# Patient Record
Sex: FEMALE | Race: WHITE | NOT HISPANIC OR LATINO | Employment: STUDENT | URBAN - METROPOLITAN AREA
[De-identification: names, ages, dates, MRNs, and addresses within clinical notes are randomized per-mention and may not be internally consistent; named-entity substitution may affect disease eponyms.]

---

## 2017-01-04 ENCOUNTER — OFFICE VISIT (OUTPATIENT)
Dept: MEDICAL GROUP | Facility: MEDICAL CENTER | Age: 22
End: 2017-01-04
Payer: COMMERCIAL

## 2017-01-04 VITALS
HEIGHT: 68 IN | HEART RATE: 111 BPM | DIASTOLIC BLOOD PRESSURE: 82 MMHG | BODY MASS INDEX: 20.92 KG/M2 | OXYGEN SATURATION: 98 % | RESPIRATION RATE: 12 BRPM | TEMPERATURE: 98 F | SYSTOLIC BLOOD PRESSURE: 104 MMHG | WEIGHT: 138 LBS

## 2017-01-04 DIAGNOSIS — Z30.40 ENCOUNTER FOR SURVEILLANCE OF CONTRACEPTIVES: ICD-10-CM

## 2017-01-04 DIAGNOSIS — R41.840 ATTENTION DEFICIT: ICD-10-CM

## 2017-01-04 DIAGNOSIS — L70.0 ACNE VULGARIS: ICD-10-CM

## 2017-01-04 DIAGNOSIS — F32.89 OTHER DEPRESSION: ICD-10-CM

## 2017-01-04 DIAGNOSIS — F41.9 ANXIETY: ICD-10-CM

## 2017-01-04 PROCEDURE — 99214 OFFICE O/P EST MOD 30 MIN: CPT | Performed by: NURSE PRACTITIONER

## 2017-01-04 RX ORDER — CITALOPRAM 40 MG/1
40 TABLET ORAL DAILY
Qty: 90 TAB | Refills: 2 | Status: SHIPPED | OUTPATIENT
Start: 2017-01-04 | End: 2017-11-11 | Stop reason: SDUPTHER

## 2017-01-04 RX ORDER — DROSPIRENONE AND ETHINYL ESTRADIOL 0.02-3(28)
1 KIT ORAL DAILY
Qty: 28 TAB | Refills: 11 | Status: SHIPPED | OUTPATIENT
Start: 2017-01-04 | End: 2017-02-16 | Stop reason: SDUPTHER

## 2017-01-04 ASSESSMENT — PATIENT HEALTH QUESTIONNAIRE - PHQ9: CLINICAL INTERPRETATION OF PHQ2 SCORE: 0

## 2017-01-04 NOTE — PROGRESS NOTES
Chief Complaint   Patient presents with   • Other     med check & review, patient home from college, wants to be up to date     • Anxiety     patient is having such issues with ongoing anxiety       HPI  Edilia is a 21-year-old established female here with a few concerns:  #1-depression: Feels her depression is stable. She had a fairly traumatic first semester of college with the death of 2 grandparents. She feels her moods are stable considering her circumstances. She denies any negative side effects of Celexa or Wellbutrin. He is sleeping well at night.  #2-anxiety: Chronic issue. Reports that her anxiety has been flaring up on her over the past 2 months. She was hoping that when she returned home from college for break her anxiety would lessen but it has not. She reports that she is picking at her scalp and is losing hair. She is not seeing a therapist. She would like to consider further treatment for her anxiety. She is doing well in school.  #3-attention deficit concerns: Patient reports that her mother tested her for attention deficit disorder and she scored fairly high. Her mother is a certified therapist. She states that she's had attention deficit problems throughout most of her schooling but with a larger workload in college, she notices her attention deficit more often. She would like to either be treated or have further testing for attention deficit disorder.  #4-acne: Currently taking Loestrin and using occasional Differin. She feels that Loestrin worsens her acne. She's not currently sexually active. Her menses are very light and occurring monthly. She's never had a blood clot and does not smoke cigarettes.    Past medical, surgical, family, and social history is reviewed and updated in Epic chart by me today.   Medications and allergies reviewed and updated in Epic chart by me today.     ROS:   As documented in history of present illness above    Exam:  Blood pressure 104/82, pulse 111, temperature  "36.7 °C (98 °F), resp. rate 12, height 1.727 m (5' 7.99\"), weight 62.596 kg (138 lb), SpO2 98 %.  Gen. appears healthy in no distress   Skin appropriate for sex and age . Mild facial acne   HEENT unremarkable   Neck no adenopathy, no nodules or masses palpable   Lungs clear   Heart regular   Extremities no edema   Neuro gait and station normal   Psych appropriate, calm, interactive, well groomed, makes good eye contact      A/P:  1. Acne vulgaris  -She'll continue with as needed topical Differin. Change to generic Tete in hopes of improving her acne.Pt counseled on birth control pills. Risks, benefits and complications from hormone use reviewed. Failure rates discussed. Back up contraception and STD prevention discussed.     2. Encounter for surveillance of contraceptives  -As above    3. Attention deficit  -She was referred to psychiatry for evaluation and may return here for treatment if this is more convenient.  - REFERRAL TO PSYCHIATRY    4. Other depression  -Recommend continuation of Wellbutrin  mg daily and we did discuss that this may also be helping her attention deficit issues. We'll increase her Celexa for anxiety purposes.  - citalopram (CELEXA) 40 MG Tab; Take 1 Tab by mouth every day.  Dispense: 90 Tab; Refill: 2    5. Anxiety  -As above.  email or f/u in 3-4 weeks.  Encouraged her to exercise at least 5 days per week and avoid caffeine when possible. Discussed potential side effects of increasing citalopram.  - citalopram (CELEXA) 40 MG Tab; Take 1 Tab by mouth every day.  Dispense: 90 Tab; Refill: 2      "

## 2017-01-04 NOTE — MR AVS SNAPSHOT
"        Susan Chanddarcy   2017 10:40 AM   Office Visit   MRN: [id number]    Department:  [address]   Dept Phone:  [phone]    Description:  Female : 1995   Provider:  ARMANDO Alvarez           Reason for Visit     Other med check & review, patient home from [facility], wants to be up to date      Anxiety patient is having such issues with ongoing anxiety      Allergies as of 2017     Allergen Noted Reactions    Nkda [No Known Drug Allergy] 2010         You were diagnosed with     Acne vulgaris   [884157]       Encounter for surveillance of contraceptives   [365098]       Attention deficit   [920746]       Other depression   [7603353]       Anxiety   [177203]         Vital Signs     Blood Pressure Pulse Temperature Respirations Height Weight    104/82 mmHg 111 36.7 °C (98 °F) 12 1.727 m (5' 7.99\") 62.596 kg (138 lb)    Body Mass Index Oxygen Saturation Smoking Status             20.99 kg/m2 98% Never Smoker          Basic Information     Date Of Birth Sex Race Ethnicity Preferred Language    1995 Female White Non- English      Problem List              ICD-10-CM Priority Class Noted - Resolved    Acne vulgaris L70.0   2015 - Present    Depression F32.9   3/21/2016 - Present      Health Maintenance        Date Due Completion Dates    IMM VARICELLA (CHICKENPOX) VACCINE (1 of 2 - 2 Dose Adolescent Series) 2010 ---    IMM MENINGOCOCCAL VACCINE (MCV4) (2 of 2) 2013    IMM INFLUENZA (1) 2016 ---    IMM DTaP/Tdap/Td Vaccine (6 - Td) 2020, 3/4/2003, 1999, 6/15/1998, 3/24/1998            Current Immunizations     Dtap Vaccine 3/4/2003, 1999, 6/15/1998, 3/24/1998    HIB Vaccine (ACTHIB/HIBERIX) 1999, 6/15/1998, 3/24/1998    HPV Quadrivalent Vaccine (GARDASIL) 2012  4:03 PM, 2012  2:51 PM, 2010    Hepatitis A Vaccine, Ped/Adol 10/24/2003, 3/4/2003    Hepatitis B Vaccine Non-Recombivax (Ped/Adol) " 5/18/1999, 3/24/1998, 1997    IPV 3/4/2003, 5/18/1999, 6/15/1998, 3/24/1998    MMR Vaccine 3/4/2003, 5/18/1999    Meningococcal Conjugate Vaccine MCV4 (Menactra) 8/23/2010    Tdap Vaccine 8/23/2010      Below and/or attached are the medications your provider expects you to take. Review all of your home medications and newly ordered medications with your provider and/or pharmacist. Follow medication instructions as directed by your provider and/or pharmacist. Please keep your medication list with you and share with your provider. Update the information when medications are discontinued, doses are changed, or new medications (including over-the-counter products) are added; and carry medication information at all times in the event of emergency situations     Allergies:  NKDA - (reactions not documented)               Medications  Valid as of: January 04, 2017 - 11:49 AM    Generic Name Brand Name Tablet Size Instructions for use    Adapalene (Cream) DIFFERIN 0.1 % Apply 1 Application to affected area(s) every evening.        Albuterol Sulfate (Aero Soln) albuterol 108 (90 BASE) MCG/ACT Inhale 1-2 Puffs by mouth every four hours as needed for Shortness of Breath (or coughing fit).        BuPROPion HCl (TABLET SR 24 HR) WELLBUTRIN  MG Take 1 Tab by mouth every morning.        Citalopram Hydrobromide (Tab) CELEXA 40 MG Take 1 Tab by mouth every day.        Drospirenone-Ethinyl Estradiol (Tab) LILIANA 3-0.02 MG Take 1 Tab by mouth every day.        Fluticasone Propionate (Suspension) FLONASE 50 MCG/ACT Spray 2 Sprays in nose every day.        ValACYclovir HCl (Tab) VALTREX 1 GM Take 1 Tab by mouth 2 times a day as needed (cold sore outbreak).        .                 Medicines prescribed today were sent to:     [id number] [place] - [address] [address]    Phone: [phone] Fax: [phone]    Open 24 Hours?: No    [id number] [place] - [address]    [address] [facility] [address]    Phone: [phone] Fax: [phone]    Open 24 Hours?: No      Medication refill instructions:       If your prescription bottle indicates you have medication refills left, it is not necessary to call your provider’s office. Please contact your pharmacy and they will refill your medication.    If your prescription bottle indicates you do not have any refills left, you may request refills at any time through one of the following ways: The online Safe[employer] system (except Urgent Care), by calling your provider’s office, or by asking your pharmacy to contact your provider’s office with a refill request. Medication refills are processed only during regular business hours and may not be available until the next business day. Your provider may request additional information or to have a follow-up visit with you prior to refilling your medication.   *Please Note: Medication refills are assigned a new Rx number when refilled electronically. Your pharmacy may indicate that no refills were authorized even though a new prescription for the same medication is available at the pharmacy. Please request the medicine by name with the pharmacy before contacting your provider for a refill.        Referral     A referral request has been sent to our patient care coordination department. Please allow 3-5 business days for us to process this request and contact you either by phone or mail. If you do not hear from us by the 5th business day, please call us at [phone].           SafeBoot Access Code: Activation code not generated  Current SafeBoot Status: Active

## 2017-02-16 RX ORDER — DROSPIRENONE AND ETHINYL ESTRADIOL 0.02-3(28)
1 KIT ORAL DAILY
Qty: 90 TAB | Refills: 3 | Status: SHIPPED | OUTPATIENT
Start: 2017-02-16 | End: 2018-03-05

## 2017-02-18 ENCOUNTER — OFFICE VISIT (OUTPATIENT)
Dept: URGENT CARE | Facility: PHYSICIAN GROUP | Age: 22
End: 2017-02-18
Payer: COMMERCIAL

## 2017-02-18 VITALS
DIASTOLIC BLOOD PRESSURE: 70 MMHG | TEMPERATURE: 98 F | OXYGEN SATURATION: 100 % | BODY MASS INDEX: 19.16 KG/M2 | SYSTOLIC BLOOD PRESSURE: 110 MMHG | WEIGHT: 126 LBS | HEART RATE: 94 BPM | RESPIRATION RATE: 12 BRPM

## 2017-02-18 DIAGNOSIS — R59.0 LYMPHADENOPATHY, POSTERIOR CERVICAL: ICD-10-CM

## 2017-02-18 DIAGNOSIS — J01.40 ACUTE NON-RECURRENT PANSINUSITIS: Primary | ICD-10-CM

## 2017-02-18 PROCEDURE — 99214 OFFICE O/P EST MOD 30 MIN: CPT | Performed by: PHYSICIAN ASSISTANT

## 2017-02-18 RX ORDER — SULFAMETHOXAZOLE AND TRIMETHOPRIM 800; 160 MG/1; MG/1
1 TABLET ORAL 2 TIMES DAILY
Qty: 20 TAB | Refills: 0 | Status: SHIPPED | OUTPATIENT
Start: 2017-02-18 | End: 2017-02-28

## 2017-02-18 NOTE — MR AVS SNAPSHOT
Susan Mcwilliams   2017 4:10 PM   Office Visit   MRN: [id number]    Department:  [facility]   Dept Phone:  [phone]    Description:  Female : 1995   Provider:  Dom Narvaez PA-C           Reason for Visit     Sinusitis sinus pressure, lump side of neck      Allergies as of 2017     Allergen Noted Reactions    Nkda [No Known Drug Allergy] 2010         You were diagnosed with     Acute non-recurrent pansinusitis   [[id number]]  -  Primary     Lymphadenopathy, posterior cervical   [[id number]]         Vital Signs     Blood Pressure Pulse Temperature Respirations Weight Oxygen Saturation    110/70 mmHg 94 36.7 °C (98 °F) 12 57.153 kg (126 lb) 100%    Smoking Status                   Never Smoker            Basic Information     Date Of Birth Sex Race Ethnicity Preferred Language    1995 Female White Non- English      Problem List              ICD-10-CM Priority Class Noted - Resolved    Acne vulgaris L70.0   2015 - Present    Depression F32.9   3/21/2016 - Present      Health Maintenance        Date Due Completion Dates    IMM VARICELLA (CHICKENPOX) VACCINE (1 of 2 - 2 Dose Adolescent Series) 2010 ---    IMM MENINGOCOCCAL VACCINE (MCV4) (2 of 2) 2013    IMM INFLUENZA (1) 2016 ---    IMM DTaP/Tdap/Td Vaccine (6 - Td) 2020, 3/4/2003, 1999, 6/15/1998, 3/24/1998            Current Immunizations     Dtap Vaccine 3/4/2003, 1999, 6/15/1998, 3/24/1998    HIB Vaccine (ACTHIB/HIBERIX) 1999, 6/15/1998, 3/24/1998    HPV Quadrivalent Vaccine (GARDASIL) 2012  4:03 PM, 2012  2:51 PM, 2010    Hepatitis A Vaccine, Ped/Adol 10/24/2003, 3/4/2003    Hepatitis B Vaccine Non-Recombivax (Ped/Adol) 1999, 3/24/1998, 1995    IPV 3/4/2003, 1999, 6/15/1998, 3/24/1998    MMR Vaccine 3/4/2003, 1999    Meningococcal Conjugate Vaccine MCV4 (Menactra) 2010    Tdap Vaccine 2010      Below and/or  attached are the medications your provider expects you to take. Review all of your home medications and newly ordered medications with your provider and/or pharmacist. Follow medication instructions as directed by your provider and/or pharmacist. Please keep your medication list with you and share with your provider. Update the information when medications are discontinued, doses are changed, or new medications (including over-the-counter products) are added; and carry medication information at all times in the event of emergency situations     Allergies:  NKDA - (reactions not documented)               Medications  Valid as of: February 18, 2017 -  5:14 PM    Generic Name Brand Name Tablet Size Instructions for use    Adapalene (Cream) DIFFERIN 0.1 % Apply 1 Application to affected area(s) every evening.        Albuterol Sulfate (Aero Soln) albuterol 108 (90 BASE) MCG/ACT Inhale 1-2 Puffs by mouth every four hours as needed for Shortness of Breath (or coughing fit).        BuPROPion HCl (TABLET SR 24 HR) WELLBUTRIN  MG Take 1 Tab by mouth every morning.        Citalopram Hydrobromide (Tab) CELEXA 40 MG Take 1 Tab by mouth every day.        Drospirenone-Ethinyl Estradiol (Tab) LILIANA 3-0.02 MG Take 1 Tab by mouth every day.        Fluticasone Propionate (Suspension) FLONASE 50 MCG/ACT Spray 2 Sprays in nose every day.        Sulfamethoxazole-Trimethoprim (Tab) BACTRIM -160 MG Take 1 Tab by mouth 2 times a day for 10 days.        ValACYclovir HCl (Tab) VALTREX 1 GM Take 1 Tab by mouth 2 times a day as needed (cold sore outbreak).        .                 Medicines prescribed today were sent to:     [id number] - [place] - [address] AT [facility] [address]    [address] [address]    Phone: [phone] Fax: [phone]    Open 24 Hours?: No    [id number] [place] - [address]    [address] [facility] [address]    Phone: [phone]  Fax: [phone]    Open 24 Hours?: No      Medication refill instructions:       If your prescription bottle indicates you have medication refills left, it is not necessary to call your provider’s office. Please contact your pharmacy and they will refill your medication.    If your prescription bottle indicates you do not have any refills left, you may request refills at any time through one of the following ways: The online [employer] system (except Urgent Care), by calling your provider’s office, or by asking your pharmacy to contact your provider’s office with a refill request. Medication refills are processed only during regular business hours and may not be available until the next business day. Your provider may request additional information or to have a follow-up visit with you prior to refilling your medication.   *Please Note: Medication refills are assigned a new Rx number when refilled electronically. Your pharmacy may indicate that no refills were authorized even though a new prescription for the same medication is available at the pharmacy. Please request the medicine by name with the pharmacy before contacting your provider for a refill.        Instructions    Sinusitis, Adult  Sinusitis is redness, soreness, and inflammation of the paranasal sinuses. Paranasal sinuses are air pockets within the bones of your face. They are located beneath your eyes, in the middle of your forehead, and above your eyes. In healthy paranasal sinuses, mucus is able to drain out, and air is able to circulate through them by way of your nose. However, when your paranasal sinuses are inflamed, mucus and air can become trapped. This can allow bacteria and other germs to grow and cause infection.  Sinusitis can develop quickly and last only a short time (acute) or continue over a long period (chronic). Sinusitis that lasts for more than 12 weeks is considered chronic.  CAUSES  Causes of sinusitis  include:  · Allergies.  · Structural abnormalities, such as displacement of the cartilage that separates your nostrils (deviated septum), which can decrease the air flow through your nose and sinuses and affect sinus drainage.  · Functional abnormalities, such as when the small hairs (cilia) that line your sinuses and help remove mucus do not work properly or are not present.  SIGNS AND SYMPTOMS  Symptoms of acute and chronic sinusitis are the same. The primary symptoms are pain and pressure around the affected sinuses. Other symptoms include:  · Upper toothache.  · Earache.  · Headache.  · Bad breath.  · Decreased sense of smell and taste.  · A cough, which worsens when you are lying flat.  · Fatigue.  · Fever.  · Thick drainage from your nose, which often is green and may contain pus (purulent).  · Swelling and warmth over the affected sinuses.  DIAGNOSIS  Your health care provider will perform a physical exam. During your exam, your health care provider may perform any of the following to help determine if you have acute sinusitis or chronic sinusitis:  · Look in your nose for signs of abnormal growths in your nostrils (nasal polyps).  · Tap over the affected sinus to check for signs of infection.  · View the inside of your sinuses using an imaging device that has a light attached (endoscope).  If your health care provider suspects that you have chronic sinusitis, one or more of the following tests may be recommended:  · Allergy tests.  · Nasal culture. A sample of mucus is taken from your nose, sent to a lab, and screened for bacteria.  · Nasal cytology. A sample of mucus is taken from your nose and examined by your health care provider to determine if your sinusitis is related to an allergy.  TREATMENT  Most cases of acute sinusitis are related to a viral infection and will resolve on their own within 10 days. Sometimes, medicines are prescribed to help relieve symptoms of both acute and chronic sinusitis.  These may include pain medicines, decongestants, nasal steroid sprays, or saline sprays.  However, for sinusitis related to a bacterial infection, your health care provider will prescribe antibiotic medicines. These are medicines that will help kill the bacteria causing the infection.  Rarely, sinusitis is caused by a fungal infection. In these cases, your health care provider will prescribe antifungal medicine.  For some cases of chronic sinusitis, surgery is needed. Generally, these are cases in which sinusitis recurs more than 3 times per year, despite other treatments.  HOME CARE INSTRUCTIONS  · Drink plenty of water. Water helps thin the mucus so your sinuses can drain more easily.  · Use a humidifier.  · Inhale steam 3-4 times a day (for example, sit in the bathroom with the shower running).  · Apply a warm, moist washcloth to your face 3-4 times a day, or as directed by your health care provider.  · Use saline nasal sprays to help moisten and clean your sinuses.  · Take medicines only as directed by your health care provider.  · If you were prescribed either an antibiotic or antifungal medicine, finish it all even if you start to feel better.  SEEK IMMEDIATE MEDICAL CARE IF:  · You have increasing pain or severe headaches.  · You have nausea, vomiting, or drowsiness.  · You have swelling around your face.  · You have vision problems.  · You have a stiff neck.  · You have difficulty breathing.     This information is not intended to replace advice given to you by your health care provider. Make sure you discuss any questions you have with your health care provider.     Document Released: 12/18/2006 Document Revised: 01/08/2016 Document Reviewed: 01/01/2013  [employer] Interactive Patient Education ©2016 [employer]            [employer] Access Code: Activation code not generated  Current S[employer] Status: Active

## 2017-02-19 NOTE — PATIENT INSTRUCTIONS

## 2017-02-19 NOTE — PROGRESS NOTES
Subjective:      Pt is a 21 y.o. female who presents with Sinusitis            Sinusitis  This is a new problem. The current episode started in the past 7 days. The problem has been gradually worsening since onset. There has been no fever. Her pain is at a severity of 7/10. The pain is moderate. Past treatments include oral decongestants and saline sprays. The treatment provided no relief.   PT presents to  clinic today complaining of sore throat, fevers, chills, watery eyes, pressure in ears, cough, fatigue, runny nose, post nasal drip, sinus pressure and headache and swollen left neck lymph node. PT denies CP, SOB, NVD, abdominal pain, joint pain. PT states these symptoms began around 5 days ago and that the pt's family has been sick on and off for the last few weeks. Pt has not taken any medications for this condition. PT states the pain is a 7/10 with sinus pressure, aching in nature and worse at night. The pt's medication list, problem list, and allergies have been evaluated and reviewed during today's visit.    PMH:  Past Medical History   Diagnosis Date   • No known health problems        PSH:  Negative per pt.      Fam Hx:    Mother alive and well with no major medical  Issues    Family Status   Relation Status Death Age   • Mother Alive    • Father Alive        Soc HX:  Social History     Social History   • Marital Status: Single     Spouse Name: N/A   • Number of Children: N/A   • Years of Education: N/A     Occupational History   • Not on file.     Social History Main Topics   • Smoking status: Never Smoker    • Smokeless tobacco: Not on file   • Alcohol Use: No   • Drug Use: No   • Sexual Activity: No     Other Topics Concern   • Not on file     Social History Narrative         Medications:    Current outpatient prescriptions:   •  sulfamethoxazole-trimethoprim (BACTRIM DS) 800-160 MG tablet, Take 1 Tab by mouth 2 times a day for 10 days., Disp: 20 Tab, Rfl: 0  •  drospirenone-ethinyl estradiol (LILIANA)  3-0.02 MG per tablet, Take 1 Tab by mouth every day., Disp: 90 Tab, Rfl: 3  •  citalopram (CELEXA) 40 MG Tab, Take 1 Tab by mouth every day., Disp: 90 Tab, Rfl: 2  •  buPROPion (WELLBUTRIN XL) 300 MG XL tablet, Take 1 Tab by mouth every morning., Disp: 90 Tab, Rfl: 3  •  adapalene (DIFFERIN) 0.1 % cream, Apply 1 Application to affected area(s) every evening., Disp: 45 g, Rfl: 3  •  fluticasone (FLONASE) 50 MCG/ACT nasal spray, Spray 2 Sprays in nose every day., Disp: 16 g, Rfl: 4  •  valacyclovir (VALTREX) 1 GM TABS, Take 1 Tab by mouth 2 times a day as needed (cold sore outbreak)., Disp: 60 Tab, Rfl: 5  •  albuterol (VENTOLIN HFA) 108 (90 BASE) MCG/ACT AERS, Inhale 1-2 Puffs by mouth every four hours as needed for Shortness of Breath (or coughing fit)., Disp: 1 Inhaler, Rfl: 2      Allergies:  Nkda        ROS  Review of Systems   Constitutional: Positive for chills and malaise/fatigue. Negative for fever.   HENT: Positive for congestion and sore throat.    Eyes: Negative for blurred vision, double vision and photophobia.   Respiratory: Positive for cough and sputum production. Negative for hemoptysis, shortness of breath and wheezing.    Cardiovascular: Negative for chest pain and palpitations.   Gastrointestinal: Negative for nausea, vomiting, abdominal pain, diarrhea and constipation.   Genitourinary: Negative for dysuria and flank pain.   Musculoskeletal: Negative for joint pain and myalgias.   Skin: Negative for itching and rash.   Neurological: Positive for headaches. Negative for dizziness and tingling.   Endo/Heme/Allergies: Does not bruise/bleed easily. +swollen lymph node  Psychiatric/Behavioral: Negative for depression. The patient is not nervous/anxious.    All other systems reviewed and are negative.       Objective:     /70 mmHg  Pulse 94  Temp(Src) 36.7 °C (98 °F)  Resp 12  Wt 57.153 kg (126 lb)  SpO2 100%     Physical Exam    Physical Exam   Constitutional: Pt is oriented to person, place,  and time. Pt appears well-developed and well-nourished. No distress.   HENT:   Head: Normocephalic and atraumatic.   Right Ear: Hearing, tympanic membrane, external ear and ear canal normal.   Left Ear: Hearing, tympanic membrane, external ear and ear canal normal.   Nose: Mucosal edema, rhinorrhea and sinus tenderness present. No nose lacerations, nasal deformity, septal deviation or nasal septal hematoma. No epistaxis.  No foreign bodies. Right sinus exhibits maxillary sinus tenderness and frontal sinus tenderness. Left sinus exhibits maxillary sinus tenderness and frontal sinus tenderness.   Mouth/Throat: Oropharynx is clear and moist. No oropharyngeal exudate.   Eyes: Conjunctivae normal and EOM are normal. Pupils are equal, round, and reactive to light. Right eye exhibits no discharge. Left eye exhibits no discharge.   Neck: Normal range of motion. Neck supple. No tracheal deviation present. No thyromegaly present.   Cardiovascular: Normal rate, regular rhythm, normal heart sounds and intact distal pulses.  Exam reveals no gallop and no friction rub.    No murmur heard.  Pulmonary/Chest: Effort normal and breath sounds normal. No respiratory distress. Pt has no wheezes. Pt has no rales. Pt exhibits no tenderness.   Abdominal: Soft. Bowel sounds are normal. Pt exhibits no distension and no mass. There is no tenderness. There is no rebound and no guarding.   Musculoskeletal: Normal range of motion. Pt exhibits no edema and no tenderness.   Lymphadenopathy:     Pt has left posterior cervical adenopathy.   Neurological: Pt is alert and oriented to person, place, and time. Pt has normal reflexes. Pt displays normal reflexes. No cranial nerve deficit. Pt exhibits normal muscle tone. Coordination normal.   Skin: Skin is warm and dry. No rash noted. No erythema.   Psychiatric: Pt has a normal mood and affect. Pt behavior is normal. Judgment and thought content normal.                 Assessment/Plan:     1. Acute  non-recurrent pansinusitis    - sulfamethoxazole-trimethoprim (BACTRIM DS) 800-160 MG tablet; Take 1 Tab by mouth 2 times a day for 10 days.  Dispense: 20 Tab; Refill: 0    2. Lymphadenopathy, posterior cervical      Rest, fluids encouraged.  OTC decongestant for congestion/cough  AVS with medical info given.  Pt was in full understanding and agreement with the plan.  Follow-up as needed if symptoms worsen or fail to improve.

## 2017-03-27 ENCOUNTER — HOSPITAL ENCOUNTER (OUTPATIENT)
Dept: LAB | Facility: MEDICAL CENTER | Age: 22
End: 2017-03-27
Attending: NURSE PRACTITIONER
Payer: COMMERCIAL

## 2017-03-27 ENCOUNTER — OFFICE VISIT (OUTPATIENT)
Dept: MEDICAL GROUP | Facility: LAB | Age: 22
End: 2017-03-27
Payer: COMMERCIAL

## 2017-03-27 VITALS
TEMPERATURE: 99.5 F | RESPIRATION RATE: 12 BRPM | OXYGEN SATURATION: 99 % | HEART RATE: 96 BPM | HEIGHT: 68 IN | BODY MASS INDEX: 19.61 KG/M2 | SYSTOLIC BLOOD PRESSURE: 92 MMHG | DIASTOLIC BLOOD PRESSURE: 72 MMHG | WEIGHT: 129.4 LBS

## 2017-03-27 DIAGNOSIS — R68.89 FREQUENTLY SICK: ICD-10-CM

## 2017-03-27 DIAGNOSIS — R23.3 EASY BRUISABILITY: ICD-10-CM

## 2017-03-27 DIAGNOSIS — L85.3 DRY SKIN DERMATITIS: ICD-10-CM

## 2017-03-27 DIAGNOSIS — J06.9 ACUTE URI: ICD-10-CM

## 2017-03-27 LAB
ANION GAP SERPL CALC-SCNC: 8 MMOL/L (ref 0–11.9)
BASOPHILS # BLD AUTO: 0.08 K/UL (ref 0–0.12)
BASOPHILS NFR BLD AUTO: 1.2 % (ref 0–1.8)
BUN SERPL-MCNC: 11 MG/DL (ref 8–22)
CALCIUM SERPL-MCNC: 9.6 MG/DL (ref 8.5–10.5)
CHLORIDE SERPL-SCNC: 104 MMOL/L (ref 96–112)
CO2 SERPL-SCNC: 26 MMOL/L (ref 20–33)
CREAT SERPL-MCNC: 0.92 MG/DL (ref 0.5–1.4)
EOSINOPHIL # BLD: 0.17 K/UL (ref 0–0.51)
EOSINOPHIL NFR BLD AUTO: 2.5 % (ref 0–6.9)
ERYTHROCYTE [DISTWIDTH] IN BLOOD BY AUTOMATED COUNT: 41.9 FL (ref 35.9–50)
GLUCOSE SERPL-MCNC: 97 MG/DL (ref 65–99)
HCT VFR BLD AUTO: 45.8 % (ref 37–47)
HGB BLD-MCNC: 15 G/DL (ref 12–16)
IMM GRANULOCYTES # BLD AUTO: 0.01 K/UL (ref 0–0.11)
IMM GRANULOCYTES NFR BLD AUTO: 0.1 % (ref 0–0.9)
LYMPHOCYTES # BLD: 2.41 K/UL (ref 1–4.8)
LYMPHOCYTES NFR BLD AUTO: 35.5 % (ref 22–41)
MCH RBC QN AUTO: 30.9 PG (ref 27–33)
MCHC RBC AUTO-ENTMCNC: 32.8 G/DL (ref 33.6–35)
MCV RBC AUTO: 94.2 FL (ref 81.4–97.8)
MONOCYTES # BLD: 0.31 K/UL (ref 0–0.85)
MONOCYTES NFR BLD AUTO: 4.6 % (ref 0–13.4)
NEUTROPHILS # BLD: 3.8 K/UL (ref 2–7.15)
NEUTROPHILS NFR BLD AUTO: 56.1 % (ref 44–72)
NRBC # BLD AUTO: 0 K/UL
NRBC BLD-RTO: 0 /100 WBC
PLATELET # BLD AUTO: 345 K/UL (ref 164–446)
PMV BLD AUTO: 9.6 FL (ref 9–12.9)
POTASSIUM SERPL-SCNC: 4.4 MMOL/L (ref 3.6–5.5)
RBC # BLD AUTO: 4.86 M/UL (ref 4.2–5.4)
SODIUM SERPL-SCNC: 138 MMOL/L (ref 135–145)
WBC # BLD AUTO: 6.8 K/UL (ref 4.8–10.8)

## 2017-03-27 PROCEDURE — 36415 COLL VENOUS BLD VENIPUNCTURE: CPT

## 2017-03-27 PROCEDURE — 85025 COMPLETE CBC W/AUTO DIFF WBC: CPT

## 2017-03-27 PROCEDURE — 80048 BASIC METABOLIC PNL TOTAL CA: CPT

## 2017-03-27 PROCEDURE — 99214 OFFICE O/P EST MOD 30 MIN: CPT | Performed by: NURSE PRACTITIONER

## 2017-03-27 NOTE — PROGRESS NOTES
"Chief Complaint   Patient presents with   • Sinusitis     pt states she had some sinus headache, swollen lymph nodes on right side, some nasal congestion, onset 2 months    • Rash     pt has a dry, scaly rash, round rash on her right leg,          HPI  Reason 21-year-old established male here with a few issues:  #1-she is concerned because she seems to be getting sick fairly frequently. She describes having a few sinus infections over the winter as well as some various coughs and colds. She is a freshman in college, living in a dorm room. She is also a nanny. The patient does not smoke. She currently has had some congestion, dry cough and a sore throat for the past week. She does have some swollen lymph nodes in her neck that she would like examined. She is also concerned because she bruises easily. She is worried about her immune system. She is not exercising regularly but she reports that she is eating healthy. She does not sleep great at night, stating that she has a roommate who wakes up really early.  #2-she has a dry rash to her right thigh that she would like examined. The rash is occasionally itchy. She has been using lotion and topical cortisone to the area which has been helping.      Past medical, surgical, family, and social history is reviewed and updated in Epic chart by me today.   Medications and allergies reviewed and updated in Epic chart by me today.     ROS:   Denies nausea, vomiting or diarrhea. No unintentional weight loss.    Exam:  Blood pressure 92/72, pulse 96, temperature 37.5 °C (99.5 °F), resp. rate 12, height 1.727 m (5' 7.99\"), weight 58.695 kg (129 lb 6.4 oz), SpO2 99 %.  Constitutional: Alert, no distress, plus 3 vital signs  Skin:  Warm, dry. She has a two centimeter dry patch to her right anterior thigh that appears to be healing, it is only slightly hypopigmented with no surrounding erythema.  Eye: Equal, round and reactive, conjunctiva clear  ENMT: Lips without lesions, good " dentition, oropharynx clear without erythema, swelling or injection.   Neck: Trachea midline, no masses, no thyromegaly. She does have bilateral anterior cervical lymphadenopathy that is nontender.  Respiratory: Unlabored respiration, lungs clear to auscultation, no wheezes, no rhonchi  Cardiovascular: Normal rate and rhythm, no murmur, no edema  Psych: Alert, pleasant, well-groomed, normal affect    A/P:  1. Frequently sick  -Discussed that it is common for her to be more sick than what she is used to during her freshman year of college and in community living. 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 Will check lab work today. Recommend vitamin C, daily physical exercise and focusing on obtaining more sleep at night if possible.  - CBC WITH DIFFERENTIAL; Future  - BASIC METABOLIC PANEL; Future    2. Easy bruisability  - CBC WITH DIFFERENTIAL; Future  - BASIC METABOLIC PANEL; Future    3.  Acute URI: Improving, per patient. No sign of bacteria. Discussed vitamin C, rest and symptomatic care. Follow up with any worsening.    4. Dry skin dermatitis: Continue with emollients moisturizers and topical cortisone as needed.

## 2017-03-27 NOTE — MR AVS SNAPSHOT
"        Susan Poppy   3/27/2017 9:00 AM   Office Visit   MRN: [id number]    Department:  [facility]   Dept Phone:  [phone]    Description:  Female : 1995   Provider:  ARMANDO Alvarez           Reason for Visit     Sinusitis pt states she had some sinus headache, swollen lymph nodes on right side, some nasal congestion, onset 2 months     Rash pt has a dry, scaly rash, round rash on her right leg,         Allergies as of 3/27/2017     Allergen Noted Reactions    Nkda [No Known Drug Allergy] 2010         You were diagnosed with     Frequently sick   [547772]       Easy bruisability   [[id number]]         Vital Signs     Blood Pressure Pulse Temperature Respirations Height Weight    92/72 mmHg 96 37.5 °C (99.5 °F) 12 1.727 m (5' 7.99\") 58.695 kg (129 lb 6.4 oz)    Body Mass Index Oxygen Saturation Smoking Status             19.68 kg/m2 99% Never Smoker          Basic Information     Date Of Birth Sex Race Ethnicity Preferred Language    1995 Female White Non- English      Problem List              ICD-10-CM Priority Class Noted - Resolved    Acne vulgaris L70.0   2015 - Present    Depression F32.9   3/21/2016 - Present      Health Maintenance        Date Due Completion Dates    IMM VARICELLA (CHICKENPOX) VACCINE (1 of 2 - 2 Dose Adolescent Series) 2010 ---    IMM MENINGOCOCCAL VACCINE (MCV4) (2 of 2) 2013    IMM INFLUENZA (1) 2016 ---    IMM DTaP/Tdap/Td Vaccine (6 - Td) 2020, 3/4/2003, 1999, 6/15/1998, 3/24/1998            Current Immunizations     Dtap Vaccine 3/4/2003, 1999, 6/15/1998, 3/24/1998    HIB Vaccine (ACTHIB/HIBERIX) 1999, 6/15/1998, 3/24/1998    HPV Quadrivalent Vaccine (GARDASIL) 2012  4:03 PM, 2012  2:51 PM, 2010    Hepatitis A Vaccine, Ped/Adol 10/24/2003, 3/4/2003    Hepatitis B Vaccine Non-Recombivax (Ped/Adol) 1999, 3/24/1998, 1997    IPV 3/4/2003, 1999, " 6/15/1998, 3/24/1998    MMR Vaccine 3/4/2003, 5/18/1999    Meningococcal Conjugate Vaccine MCV4 (Menactra) 8/23/2010    Tdap Vaccine 8/23/2010      Below and/or attached are the medications your provider expects you to take. Review all of your home medications and newly ordered medications with your provider and/or pharmacist. Follow medication instructions as directed by your provider and/or pharmacist. Please keep your medication list with you and share with your provider. Update the information when medications are discontinued, doses are changed, or new medications (including over-the-counter products) are added; and carry medication information at all times in the event of emergency situations     Allergies:  NKDA - (reactions not documented)               Medications  Valid as of: March 27, 2017 -  9:41 AM    Generic Name Brand Name Tablet Size Instructions for use    Albuterol Sulfate (Aero Soln) albuterol 108 (90 BASE) MCG/ACT Inhale 1-2 Puffs by mouth every four hours as needed for Shortness of Breath (or coughing fit).        BuPROPion HCl (TABLET SR 24 HR) WELLBUTRIN  MG Take 1 Tab by mouth every morning.        Citalopram Hydrobromide (Tab) CELEXA 40 MG Take 1 Tab by mouth every day.        Drospirenone-Ethinyl Estradiol (Tab) LILIANA 3-0.02 MG Take 1 Tab by mouth every day.        Fluticasone Propionate (Suspension) FLONASE 50 MCG/ACT Spray 2 Sprays in nose every day.        ValACYclovir HCl (Tab) VALTREX 1 GM Take 1 Tab by mouth 2 times a day as needed (cold sore outbreak).        .                 Medicines prescribed today were sent to:     [id number] - [place] - [address] AT [facility] [address]    [address] [address]    Phone: [phone] Fax: [phone]    Open 24 Hours?: No    [id number] [place] - [address]    [address] [facility] [address]    Phone: [phone] Fax: [phone]    Open 24 Hours?: No         Medication refill instructions:       If your prescription bottle indicates you have medication refills left, it is not necessary to call your provider’s office. Please contact your pharmacy and they will refill your medication.    If your prescription bottle indicates you do not have any refills left, you may request refills at any time through one of the following ways: The online "[employer] system (except Urgent Care), by calling your provider’s office, or by asking your pharmacy to contact your provider’s office with a refill request. Medication refills are processed only during regular business hours and may not be available until the next business day. Your provider may request additional information or to have a follow-up visit with you prior to refilling your medication.   *Please Note: Medication refills are assigned a new Rx number when refilled electronically. Your pharmacy may indicate that no refills were authorized even though a new prescription for the same medication is available at the pharmacy. Please request the medicine by name with the pharmacy before contacting your provider for a refill.        Your To Do List     Future Labs/Procedures Complete By Expires    BASIC METABOLIC PANEL  As directed 3/27/2018    CBC WITH DIFFERENTIAL  As directed 3/28/2018         "[employer] Access Code: Activation code not generated  Current "[employer] Status: Active

## 2017-08-10 RX ORDER — BUPROPION HYDROCHLORIDE 300 MG/1
TABLET ORAL
Qty: 90 TAB | Refills: 1 | Status: SHIPPED | OUTPATIENT
Start: 2017-08-10 | End: 2018-02-10 | Stop reason: SDUPTHER

## 2017-08-10 NOTE — TELEPHONE ENCOUNTER
Was the patient seen in the last year in this department? Yes     Does patient have an active prescription for medications requested? No     Received Request Via: Pharmacy     Last visit:3/27/17

## 2017-11-11 DIAGNOSIS — F41.9 ANXIETY: ICD-10-CM

## 2017-11-11 DIAGNOSIS — F32.89 OTHER DEPRESSION: ICD-10-CM

## 2017-11-13 RX ORDER — CITALOPRAM 40 MG/1
40 TABLET ORAL DAILY
Qty: 90 TAB | Refills: 3 | Status: SHIPPED | OUTPATIENT
Start: 2017-11-13 | End: 2018-03-05 | Stop reason: SDUPTHER

## 2017-11-13 NOTE — TELEPHONE ENCOUNTER
Was the patient seen in the last year in this department? Yes Lov 3/27/2017    Does patient have an active prescription for medications requested? No     Received Request Via: Pharmacy

## 2017-11-26 RX ORDER — DROSPIRENONE AND ETHINYL ESTRADIOL TABLETS 0.02-3(28)
1 KIT ORAL DAILY
Qty: 28 TAB | Refills: 3 | Status: SHIPPED | OUTPATIENT
Start: 2017-11-26 | End: 2018-03-05

## 2018-02-12 RX ORDER — BUPROPION HYDROCHLORIDE 300 MG/1
TABLET ORAL
Qty: 90 TAB | Refills: 0 | Status: SHIPPED | OUTPATIENT
Start: 2018-02-12 | End: 2018-03-05 | Stop reason: SDUPTHER

## 2018-03-05 ENCOUNTER — HOSPITAL ENCOUNTER (OUTPATIENT)
Facility: MEDICAL CENTER | Age: 23
End: 2018-03-05
Attending: NURSE PRACTITIONER
Payer: COMMERCIAL

## 2018-03-05 ENCOUNTER — OFFICE VISIT (OUTPATIENT)
Dept: MEDICAL GROUP | Facility: LAB | Age: 23
End: 2018-03-05
Payer: COMMERCIAL

## 2018-03-05 VITALS
TEMPERATURE: 98.2 F | DIASTOLIC BLOOD PRESSURE: 80 MMHG | HEIGHT: 68 IN | WEIGHT: 142 LBS | HEART RATE: 98 BPM | BODY MASS INDEX: 21.52 KG/M2 | OXYGEN SATURATION: 97 % | RESPIRATION RATE: 12 BRPM | SYSTOLIC BLOOD PRESSURE: 108 MMHG

## 2018-03-05 DIAGNOSIS — Z11.3 SCREEN FOR STD (SEXUALLY TRANSMITTED DISEASE): ICD-10-CM

## 2018-03-05 DIAGNOSIS — T78.40XA HYPERSENSITIVITY DISORDER, INITIAL ENCOUNTER: ICD-10-CM

## 2018-03-05 DIAGNOSIS — F33.42 RECURRENT MAJOR DEPRESSIVE DISORDER, IN FULL REMISSION (HCC): ICD-10-CM

## 2018-03-05 DIAGNOSIS — F42.4 PICKING OWN SKIN: ICD-10-CM

## 2018-03-05 DIAGNOSIS — D22.9 ATYPICAL NEVI: ICD-10-CM

## 2018-03-05 PROCEDURE — 87491 CHLMYD TRACH DNA AMP PROBE: CPT

## 2018-03-05 PROCEDURE — 87591 N.GONORRHOEAE DNA AMP PROB: CPT

## 2018-03-05 PROCEDURE — 99214 OFFICE O/P EST MOD 30 MIN: CPT | Performed by: NURSE PRACTITIONER

## 2018-03-05 RX ORDER — BUPROPION HYDROCHLORIDE 150 MG/1
150 TABLET ORAL EVERY MORNING
Qty: 30 TAB | Refills: 5 | Status: SHIPPED | OUTPATIENT
Start: 2018-03-05 | End: 2019-08-13

## 2018-03-05 RX ORDER — CITALOPRAM 40 MG/1
40 TABLET ORAL DAILY
Qty: 90 TAB | Refills: 3 | Status: SHIPPED | OUTPATIENT
Start: 2018-03-05 | End: 2019-08-13

## 2018-03-05 ASSESSMENT — PATIENT HEALTH QUESTIONNAIRE - PHQ9: CLINICAL INTERPRETATION OF PHQ2 SCORE: 0

## 2018-03-05 NOTE — PROGRESS NOTES
"Chief Complaint   Patient presents with   • Nevus     pt states she has a new mole on the left side of her head   • Depression     medication refill        HPI  Susan is a 21 yo est female here with a few issues:   #1--her father noticed a mole to her scalp that she would like examined today. The mole does not cause her itching or pain.  #2-depression with anxiety: This is a chronic issue for the patient. She is currently treated with Wellbutrin  mg and citalopram 40 mg daily. She is happy to report that her moods of been doing great and she even went through a breakup in the past 2-3 weeks which did not affect deeply. She is having some trouble falling asleep but this has been a chronic issue. She has a good appetite. She is feeling anxious, hypersensitive and feels that even sheets touching her in bed irritate her. She is noticing that she is picking her face, scalp or the skin on her hands. She has noticed that she's been picking and hypersensitive for several months. Denies any anxiety or panic attacks.  #3-STD screening: She's never been screened for gonorrhea or Chlamydia. She is newly sexually active in the past year, not currently sexually active. She stopped her birth control a couple of months ago, was only using condoms and she was sexually active. She has had a menstrual period in the past 4 weeks.      Past medical, surgical, family, and social history is reviewed and updated in Epic chart by me today.   Medications and allergies reviewed and updated in Epic chart by me today.     ROS:   As documented in history of present illness above    Exam:  Blood pressure 108/80, pulse 98, temperature 36.8 °C (98.2 °F), resp. rate 12, height 1.727 m (5' 7.99\"), weight 64.4 kg (142 lb), SpO2 97 %.  Constitutional: Alert, no distress, plus 3 vital signs  Skin:  Warm, dry.  1 cm x 8 mm symmetrical, flat nevi to left temple - slight increased pigmentation - no black or scaling.  No wounds noticed to forearms " / face / hands or scalp.    Eye: Equal, round and reactive, conjunctiva clear  ENMT: Lips without lesions, good dentition, oropharynx clear    Neck: Trachea midline, no masses, no thyromegaly  Respiratory: Unlabored respiration, lungs clear to auscultation, no wheezes, no rhonchi  Cardiovascular: Normal rate and rhythm  Psych: Alert, pleasant, well-groomed, normal affect    A/P:  1. Recurrent major depressive disorder, in full remission (CMS-HCC)  -Suspicious that Wellbutrin may be causing her increased sensitivity of her skin and exacerbating her anxiety, potentially causing her picking. We'll try reducing her Wellbutrin to 150 mg daily and have her follow up within 4 weeks. She'll continue on citalopram.  - buPROPion (WELLBUTRIN XL) 150 MG XL tablet; Take 1 Tab by mouth every morning.  Dispense: 30 Tab; Refill: 5  - citalopram (CELEXA) 40 MG Tab; Take 1 Tab by mouth every day.  Dispense: 90 Tab; Refill: 3    2. Hypersensitivity disorder, initial encounter  -As above. Encouraged her to continue with her exercise program and avoidance of caffeine.    3. Picking own skin  -As above.    4. Screen for STD (sexually transmitted disease)  -Recommend she start having Pap smears age 23. Gonorrhea and chlamydia was collected via self swab today. Discussed the importance of condom use to prevent STDs.  - CHLAMYDIA/GC PCR URINE OR SWAB; Future    5.  Benign nevi:  Reassured. Discussed the characteristics of benign versus malignant moles.

## 2018-03-06 LAB
C TRACH DNA SPEC QL NAA+PROBE: NEGATIVE
N GONORRHOEA DNA SPEC QL NAA+PROBE: NEGATIVE
SPECIMEN SOURCE: NORMAL

## 2018-05-15 ENCOUNTER — OFFICE VISIT (OUTPATIENT)
Dept: MEDICAL GROUP | Facility: LAB | Age: 23
End: 2018-05-15
Payer: COMMERCIAL

## 2018-05-15 VITALS
SYSTOLIC BLOOD PRESSURE: 90 MMHG | BODY MASS INDEX: 21.98 KG/M2 | DIASTOLIC BLOOD PRESSURE: 62 MMHG | HEART RATE: 114 BPM | HEIGHT: 68 IN | RESPIRATION RATE: 12 BRPM | TEMPERATURE: 98.4 F | OXYGEN SATURATION: 97 % | WEIGHT: 145 LBS

## 2018-05-15 DIAGNOSIS — R59.0 POSTAURICULAR ADENOPATHY: ICD-10-CM

## 2018-05-15 DIAGNOSIS — F33.42 RECURRENT MAJOR DEPRESSIVE DISORDER, IN FULL REMISSION (HCC): ICD-10-CM

## 2018-05-15 DIAGNOSIS — Z00.00 ROUTINE GENERAL MEDICAL EXAMINATION AT A HEALTH CARE FACILITY: ICD-10-CM

## 2018-05-15 PROCEDURE — 99214 OFFICE O/P EST MOD 30 MIN: CPT | Performed by: NURSE PRACTITIONER

## 2018-05-15 RX ORDER — BUPROPION HYDROCHLORIDE 300 MG/1
300 TABLET ORAL EVERY MORNING
Qty: 90 TAB | Refills: 0
Start: 2018-05-15 | End: 2018-06-12 | Stop reason: SDUPTHER

## 2018-05-15 NOTE — PROGRESS NOTES
"Chief Complaint   Patient presents with   • Nodule     pt states she has a bump on the back her right ear, painful & hard, onset 4 days       HPI  Susan is a 22-year-old established female here to discuss a painful lump behind her right ear and her moods.     #1-Found painful bump behind right ear one week ago.  No internal ear pain or recent illness.  No trauma.  No hearing problems.  No ST or trouble swallowing. Denies any recent flareups of allergies, stating she rarely has any allergy symptoms. No unintentional weight changes. No other associated symptoms. Her dad tells her that she is tired a lot.    #2-depression: Chronic issue. Feels her moods are doing really well with Celexa 80 mg and Wellbutrin 300 mg. She tried to go down to 150 mg of Wellbutrin but felt her depression flaring up on her. She does not feel that she is tired all the time. Her motivation is good to study and attend work.    Prenatal vitamin containing iron b/c she is a vegetarian  Topical minocycline agent through dermatology    Past medical, surgical, family, and social history is reviewed and updated in Epic chart by me today.   Medications and allergies reviewed and updated in Epic chart by me today.     ROS:   As documented in history of present illness above    Exam:  Blood pressure (!) 90/62, pulse (!) 114, temperature 36.9 °C (98.4 °F), resp. rate 12, height 1.727 m (5' 7.99\"), weight 65.8 kg (145 lb), SpO2 97 %.  Constitutional: Alert, no distress, plus 3 vital signs  Skin:  Warm, dry, no rashes invisible areas  Eye: Equal, round and reactive, conjunctiva clear  ENMT: Lips without lesions, good dentition, oropharynx with +1 tonsillar hypertrophy, no erythema or exudate. She has wine, what she calls mildly tender posterior auricular lymph node that measures about 3 mm and is soft/movable. She has mild bilateral nontender anterior cervical lymphadenopathy. Tympanic membranes are translucent bilaterally.  Respiratory: Unlabored " respiration, lungs clear to auscultation, no wheezes, no rhonchi  Cardiovascular: Normal rate and rhythm  Psych: Alert, pleasant, well-groomed, normal affect    A/P:  1. Postauricular adenopathy  -Discussed likely benign and reactive nature. We'll check a CBC. Recommend a follow-up if this does not resolve within one month. Discussed massage and heating pads.    2. Recurrent major depressive disorder, in full remission (HCC)  -Stable. Should like to stay on 300 mg of Wellbutrin and 40 mg of Celexa. No SI or HI.  - buPROPion (WELLBUTRIN XL) 300 MG XL tablet; Take 1 Tab by mouth every morning.  Dispense: 90 Tab; Refill: 0    3. Routine general medical examination at a health care facility  -Recommend routine updated blood work.  - TSH WITH REFLEX TO FT4; Future  - COMP METABOLIC PANEL; Future  - CBC WITH DIFFERENTIAL; Future

## 2018-06-01 ENCOUNTER — HOSPITAL ENCOUNTER (OUTPATIENT)
Dept: LAB | Facility: MEDICAL CENTER | Age: 23
End: 2018-06-01
Attending: NURSE PRACTITIONER
Payer: COMMERCIAL

## 2018-06-01 DIAGNOSIS — Z00.00 ROUTINE GENERAL MEDICAL EXAMINATION AT A HEALTH CARE FACILITY: ICD-10-CM

## 2018-06-01 LAB
ALBUMIN SERPL BCP-MCNC: 4.5 G/DL (ref 3.2–4.9)
ALBUMIN/GLOB SERPL: 1.5 G/DL
ALP SERPL-CCNC: 67 U/L (ref 30–99)
ALT SERPL-CCNC: 10 U/L (ref 2–50)
ANION GAP SERPL CALC-SCNC: 8 MMOL/L (ref 0–11.9)
AST SERPL-CCNC: 16 U/L (ref 12–45)
BASOPHILS # BLD AUTO: 0.6 % (ref 0–1.8)
BASOPHILS # BLD: 0.03 K/UL (ref 0–0.12)
BILIRUB SERPL-MCNC: 0.5 MG/DL (ref 0.1–1.5)
BUN SERPL-MCNC: 10 MG/DL (ref 8–22)
CALCIUM SERPL-MCNC: 9.6 MG/DL (ref 8.5–10.5)
CHLORIDE SERPL-SCNC: 108 MMOL/L (ref 96–112)
CO2 SERPL-SCNC: 22 MMOL/L (ref 20–33)
CREAT SERPL-MCNC: 0.84 MG/DL (ref 0.5–1.4)
EOSINOPHIL # BLD AUTO: 0.06 K/UL (ref 0–0.51)
EOSINOPHIL NFR BLD: 1.3 % (ref 0–6.9)
ERYTHROCYTE [DISTWIDTH] IN BLOOD BY AUTOMATED COUNT: 42.4 FL (ref 35.9–50)
GLOBULIN SER CALC-MCNC: 3 G/DL (ref 1.9–3.5)
GLUCOSE SERPL-MCNC: 90 MG/DL (ref 65–99)
HCT VFR BLD AUTO: 44.3 % (ref 37–47)
HGB BLD-MCNC: 14.2 G/DL (ref 12–16)
IMM GRANULOCYTES # BLD AUTO: 0 K/UL (ref 0–0.11)
IMM GRANULOCYTES NFR BLD AUTO: 0 % (ref 0–0.9)
LYMPHOCYTES # BLD AUTO: 1.74 K/UL (ref 1–4.8)
LYMPHOCYTES NFR BLD: 37.4 % (ref 22–41)
MCH RBC QN AUTO: 28.7 PG (ref 27–33)
MCHC RBC AUTO-ENTMCNC: 32.1 G/DL (ref 33.6–35)
MCV RBC AUTO: 89.7 FL (ref 81.4–97.8)
MONOCYTES # BLD AUTO: 0.27 K/UL (ref 0–0.85)
MONOCYTES NFR BLD AUTO: 5.8 % (ref 0–13.4)
NEUTROPHILS # BLD AUTO: 2.55 K/UL (ref 2–7.15)
NEUTROPHILS NFR BLD: 54.9 % (ref 44–72)
NRBC # BLD AUTO: 0 K/UL
NRBC BLD-RTO: 0 /100 WBC
PLATELET # BLD AUTO: 343 K/UL (ref 164–446)
PMV BLD AUTO: 10.3 FL (ref 9–12.9)
POTASSIUM SERPL-SCNC: 3.8 MMOL/L (ref 3.6–5.5)
PROT SERPL-MCNC: 7.5 G/DL (ref 6–8.2)
RBC # BLD AUTO: 4.94 M/UL (ref 4.2–5.4)
SODIUM SERPL-SCNC: 138 MMOL/L (ref 135–145)
TSH SERPL DL<=0.005 MIU/L-ACNC: 2.7 UIU/ML (ref 0.38–5.33)
WBC # BLD AUTO: 4.7 K/UL (ref 4.8–10.8)

## 2018-06-01 PROCEDURE — 36415 COLL VENOUS BLD VENIPUNCTURE: CPT

## 2018-06-01 PROCEDURE — 84443 ASSAY THYROID STIM HORMONE: CPT

## 2018-06-01 PROCEDURE — 85025 COMPLETE CBC W/AUTO DIFF WBC: CPT

## 2018-06-01 PROCEDURE — 80053 COMPREHEN METABOLIC PANEL: CPT

## 2018-06-12 DIAGNOSIS — F33.42 RECURRENT MAJOR DEPRESSIVE DISORDER, IN FULL REMISSION (HCC): ICD-10-CM

## 2018-06-12 RX ORDER — BUPROPION HYDROCHLORIDE 300 MG/1
300 TABLET ORAL EVERY MORNING
Qty: 90 TAB | Refills: 0 | Status: SHIPPED | OUTPATIENT
Start: 2018-06-12 | End: 2018-09-06 | Stop reason: SDUPTHER

## 2018-08-16 ENCOUNTER — NON-PROVIDER VISIT (OUTPATIENT)
Dept: URGENT CARE | Facility: CLINIC | Age: 23
End: 2018-08-16
Payer: COMMERCIAL

## 2018-08-16 DIAGNOSIS — Z11.1 ENCOUNTER FOR PPD TEST: ICD-10-CM

## 2018-08-16 PROCEDURE — 86580 TB INTRADERMAL TEST: CPT | Performed by: NURSE PRACTITIONER

## 2018-08-16 NOTE — PROGRESS NOTES
Susan Mcwilliams is a 22 y.o. female here for a non-provider visit for PPD placement -- Step 1 of 1    Reason for PPD:  work requirement    1. TB evaluation questionnaire completed by patient? Yes      -  If any answers marked yes did you contact a provider prior to placing? Not Indicated  2.  Patient notified to return to clinic for reading on: Aug 18 after 3:01 pm and Aug 19 before 3:01pm  3.  PPD Placement documentation completed on TB evaluation questionnaire? Yes  4.  Location of TB evaluation questionnaire filed: [id number] [place] [address]

## 2018-08-19 ENCOUNTER — NON-PROVIDER VISIT (OUTPATIENT)
Dept: URGENT CARE | Facility: CLINIC | Age: 23
End: 2018-08-19

## 2018-08-19 LAB — TB WHEAL 3D P 5 TU DIAM: NORMAL MM

## 2018-08-19 NOTE — NON-PROVIDER
Susan Mcwilliams is a 22 y.o. female here for a non-provider visit for PPD reading -- Step 1 of 1.      1.  Resulted in Epic under enter/edit results? Yes   2.  TB evaluation questionnaire scanned into chart and original given to patient?Yes      3. Was induration greater than 0 mm? No.    Routed to PCP? No

## 2018-09-06 DIAGNOSIS — F33.42 RECURRENT MAJOR DEPRESSIVE DISORDER, IN FULL REMISSION (HCC): ICD-10-CM

## 2018-09-06 RX ORDER — BUPROPION HYDROCHLORIDE 300 MG/1
300 TABLET ORAL EVERY MORNING
Qty: 90 TAB | Refills: 3 | Status: SHIPPED | OUTPATIENT
Start: 2018-09-06 | End: 2019-08-13

## 2018-09-06 NOTE — TELEPHONE ENCOUNTER
From: Susan Mcwilliams  Sent: 9/6/2018 10:52 AM PDT  Subject: Medication Renewal Request    Susan Mcwilliams would like a refill of the following medications:     buPROPion (WELLBUTRIN XL) 300 MG XL tablet [Iliana Sandoval, A.P.N.]    Preferred pharmacy: [facility] [address] [place] [address], [place] - [address]    Comment:

## 2018-09-06 NOTE — TELEPHONE ENCOUNTER
Was the patient seen in the last year in this department? Yes    Does patient have an active prescription for medications requested? No     Received Request Via: Patient     Last visit:5/15/18

## 2018-10-22 ENCOUNTER — OFFICE VISIT (OUTPATIENT)
Dept: URGENT CARE | Facility: CLINIC | Age: 23
End: 2018-10-22
Payer: COMMERCIAL

## 2018-10-22 VITALS
WEIGHT: 140 LBS | RESPIRATION RATE: 16 BRPM | HEART RATE: 91 BPM | OXYGEN SATURATION: 98 % | TEMPERATURE: 98.7 F | BODY MASS INDEX: 21.22 KG/M2 | DIASTOLIC BLOOD PRESSURE: 74 MMHG | SYSTOLIC BLOOD PRESSURE: 104 MMHG | HEIGHT: 68 IN

## 2018-10-22 DIAGNOSIS — R05.9 COUGH: ICD-10-CM

## 2018-10-22 DIAGNOSIS — J34.89 NASAL CONGESTION WITH RHINORRHEA: ICD-10-CM

## 2018-10-22 DIAGNOSIS — R09.81 NASAL CONGESTION WITH RHINORRHEA: ICD-10-CM

## 2018-10-22 DIAGNOSIS — J02.9 SORE THROAT: ICD-10-CM

## 2018-10-22 DIAGNOSIS — J06.9 VIRAL URI WITH COUGH: ICD-10-CM

## 2018-10-22 LAB
INT CON NEG: NEGATIVE
INT CON POS: POSITIVE
S PYO AG THROAT QL: NEGATIVE

## 2018-10-22 PROCEDURE — 87880 STREP A ASSAY W/OPTIC: CPT | Performed by: NURSE PRACTITIONER

## 2018-10-22 PROCEDURE — 99214 OFFICE O/P EST MOD 30 MIN: CPT | Performed by: NURSE PRACTITIONER

## 2018-10-22 RX ORDER — ACETAMINOPHEN 500 MG
500-1000 TABLET ORAL EVERY 6 HOURS PRN
COMMUNITY
End: 2019-08-13

## 2018-10-22 RX ORDER — FLUTICASONE PROPIONATE 50 MCG
2 SPRAY, SUSPENSION (ML) NASAL DAILY
Qty: 1 BOTTLE | Refills: 3 | Status: SHIPPED | OUTPATIENT
Start: 2018-10-22 | End: 2020-02-13

## 2018-10-22 ASSESSMENT — ENCOUNTER SYMPTOMS
SINUS PAIN: 0
FEVER: 1
CHILLS: 0
SORE THROAT: 1
WEAKNESS: 0

## 2018-10-22 NOTE — PROGRESS NOTES
"Subjective:      Susan Mcwilliams is a 22 y.o. female who presents with Pharyngitis (x 1 day and states this morning was worse with hard to breathe)            HPI  C/o sore throat, nasal congestion, PND and sore throat. C/o upper airway \"feeling pressure\" with cough, denies asthma/SOB/wheeze. C/o \"feverish\", took NSAID x 1. No saline nasal rinse or salt water gargling.    PMH:  has a past medical history of No known health problems.  MEDS:   Current Outpatient Prescriptions:   •  acetaminophen (TYLENOL) 500 MG Tab, Take 500-1,000 mg by mouth every 6 hours as needed., Disp: , Rfl:   •  fluticasone (FLONASE) 50 MCG/ACT nasal spray, Spray 2 Sprays in nose every day., Disp: 1 Bottle, Rfl: 3  •  citalopram (CELEXA) 40 MG Tab, Take 1 Tab by mouth every day., Disp: 90 Tab, Rfl: 3  •  albuterol (VENTOLIN HFA) 108 (90 BASE) MCG/ACT AERS, Inhale 1-2 Puffs by mouth every four hours as needed for Shortness of Breath (or coughing fit)., Disp: 1 Inhaler, Rfl: 2  •  buPROPion (WELLBUTRIN XL) 300 MG XL tablet, Take 1 Tab by mouth every morning., Disp: 90 Tab, Rfl: 3  •  buPROPion (WELLBUTRIN XL) 150 MG XL tablet, Take 1 Tab by mouth every morning., Disp: 30 Tab, Rfl: 5  •  valacyclovir (VALTREX) 1 GM TABS, Take 1 Tab by mouth 2 times a day as needed (cold sore outbreak)., Disp: 60 Tab, Rfl: 5  ALLERGIES:   Allergies   Allergen Reactions   • Nkda [No Known Drug Allergy]      SURGHX: History reviewed. No pertinent surgical history.  SOCHX:  reports that she has never smoked. She has never used smokeless tobacco. She reports that she does not drink alcohol or use drugs.  FH: Family history was reviewed, no pertinent findings to report    Review of Systems   Constitutional: Positive for fever and malaise/fatigue. Negative for chills.   HENT: Positive for congestion, ear pain and sore throat. Negative for sinus pain.    Neurological: Negative for weakness.   All other systems reviewed and are negative.         Objective:     /74 " "(BP Location: Left arm, Patient Position: Sitting, BP Cuff Size: Adult)   Pulse 91   Temp 37.1 °C (98.7 °F) (Temporal)   Resp 16   Ht 1.727 m (5' 8\")   Wt 63.5 kg (140 lb)   SpO2 98%   BMI 21.29 kg/m²      Physical Exam   Constitutional: She is oriented to person, place, and time. Vital signs are normal. She appears well-developed and well-nourished. She is active and cooperative.  Non-toxic appearance. She does not have a sickly appearance. She does not appear ill. No distress.   HENT:   Head: Normocephalic.   Right Ear: External ear and ear canal normal.   Left Ear: Tympanic membrane, external ear and ear canal normal.   Nose: Mucosal edema and rhinorrhea present.   Mouth/Throat: Uvula is midline and mucous membranes are normal. No uvula swelling. Posterior oropharyngeal erythema present. No posterior oropharyngeal edema.   Eyes: Pupils are equal, round, and reactive to light. Conjunctivae and EOM are normal. Right eye exhibits no discharge. Left eye exhibits no discharge.   Neck: Normal range of motion.   Cardiovascular: Normal rate and regular rhythm.    Pulmonary/Chest: Effort normal and breath sounds normal. No accessory muscle usage. No respiratory distress. She has no decreased breath sounds. She has no wheezes. She has no rhonchi. She has no rales.   Abdominal: Soft. Bowel sounds are normal. She exhibits no distension. There is no tenderness. There is no rebound and no guarding.   Musculoskeletal: Normal range of motion.   Lymphadenopathy:     She has no cervical adenopathy.   Neurological: She is alert and oriented to person, place, and time.   Skin: Skin is warm and dry. She is not diaphoretic.               Assessment/Plan:     1. Nasal congestion with rhinorrhea    - fluticasone (FLONASE) 50 MCG/ACT nasal spray; Spray 2 Sprays in nose every day.  Dispense: 1 Bottle; Refill: 3    2. Sore throat    - POCT Rapid Strep A: NEG    3. Cough    4. Viral URI with cough    Increase water intake  May use " Ibuprofen/Tylenol prn for any fever, body aches or throat pain  May take long acting antihistamine for seasonal allergy symptoms prn  May use saline nasal spray/destinee pot for nasal congestion prn  May use Nasacort/Flonase prn for nasal congestion  May use throat lozenges for throat discomfort  May gargle with salt water prn for throat discomfort  May drink smoothies for nutrition if too painful to swallow solid foods  Monitor for continued fever with treatment, any sinus pain/pressure with sinus congestion with thick mucus production and HA- need re-evaluation  Monitor for productive cough, SOB and chest pain/tightness, fever- need re-evaluation

## 2018-12-17 ENCOUNTER — OFFICE VISIT (OUTPATIENT)
Dept: MEDICAL GROUP | Facility: LAB | Age: 23
End: 2018-12-17
Payer: COMMERCIAL

## 2018-12-17 ENCOUNTER — HOSPITAL ENCOUNTER (OUTPATIENT)
Facility: MEDICAL CENTER | Age: 23
End: 2018-12-17
Attending: NURSE PRACTITIONER
Payer: COMMERCIAL

## 2018-12-17 VITALS
TEMPERATURE: 97.6 F | WEIGHT: 158 LBS | OXYGEN SATURATION: 99 % | RESPIRATION RATE: 14 BRPM | DIASTOLIC BLOOD PRESSURE: 80 MMHG | SYSTOLIC BLOOD PRESSURE: 122 MMHG | BODY MASS INDEX: 23.95 KG/M2 | HEART RATE: 90 BPM | HEIGHT: 68 IN

## 2018-12-17 DIAGNOSIS — Z00.00 PREVENTATIVE HEALTH CARE: ICD-10-CM

## 2018-12-17 DIAGNOSIS — F33.1 MODERATE EPISODE OF RECURRENT MAJOR DEPRESSIVE DISORDER (HCC): ICD-10-CM

## 2018-12-17 DIAGNOSIS — Z12.4 SCREENING FOR MALIGNANT NEOPLASM OF CERVIX: ICD-10-CM

## 2018-12-17 DIAGNOSIS — Z01.419 ENCOUNTER FOR GYNECOLOGICAL EXAMINATION: ICD-10-CM

## 2018-12-17 PROCEDURE — 99395 PREV VISIT EST AGE 18-39: CPT | Performed by: NURSE PRACTITIONER

## 2018-12-17 PROCEDURE — 87491 CHLMYD TRACH DNA AMP PROBE: CPT

## 2018-12-17 PROCEDURE — 88175 CYTOPATH C/V AUTO FLUID REDO: CPT

## 2018-12-17 PROCEDURE — 87591 N.GONORRHOEAE DNA AMP PROB: CPT

## 2018-12-17 ASSESSMENT — PATIENT HEALTH QUESTIONNAIRE - PHQ9
SUM OF ALL RESPONSES TO PHQ9 QUESTIONS 1 AND 2: 6
5. POOR APPETITE OR OVEREATING: NEARLY EVERY DAY
8. MOVING OR SPEAKING SO SLOWLY THAT OTHER PEOPLE COULD HAVE NOTICED. OR THE OPPOSITE, BEING SO FIGETY OR RESTLESS THAT YOU HAVE BEEN MOVING AROUND A LOT MORE THAN USUAL: NOT AT ALL
6. FEELING BAD ABOUT YOURSELF - OR THAT YOU ARE A FAILURE OR HAVE LET YOURSELF OR YOUR FAMILY DOWN: NEARLY EVERY DAY
SUM OF ALL RESPONSES TO PHQ QUESTIONS 1-9: 22
4. FEELING TIRED OR HAVING LITTLE ENERGY: NEARLY EVERY DAY
9. THOUGHTS THAT YOU WOULD BE BETTER OFF DEAD, OR OF HURTING YOURSELF: SEVERAL DAYS
2. FEELING DOWN, DEPRESSED, IRRITABLE, OR HOPELESS: NEARLY EVERY DAY
1. LITTLE INTEREST OR PLEASURE IN DOING THINGS: NEARLY EVERY DAY
7. TROUBLE CONCENTRATING ON THINGS, SUCH AS READING THE NEWSPAPER OR WATCHING TELEVISION: NEARLY EVERY DAY
3. TROUBLE FALLING OR STAYING ASLEEP OR SLEEPING TOO MUCH: NEARLY EVERY DAY

## 2018-12-17 NOTE — PROGRESS NOTES
Chief Complaint   Patient presents with   • Annual Exam     with Pap/ Depression score 22/ Questions regarding thyroid       HPI:  Susan is a 23 y.o.  female who presents for annual exam.  Only gynecological complaints is frequent yeast infections, described as itching and white discharge.  Not currently sexually active.   [employer] in college.  Nonsmoker.  Has only had two periods this year, prior to this year, was having one every other month for few years.  Has gained 30- 40 lbs this year. Wants to avoid OCP b/c of moods.    Suffers from chronic anxiety and depression, more so depression lately.  Has not seen a therapist since 9/2018 b/c of schedule with new job but returned to her therapist last week which has been helpful.  Compliant with Wellbutrin and Celexa.  Picking skin, crying easily, low motivation, sad, anxious - really struggling.  Tells me that her room is a mess.  Denies suicidal plan or HI.  Does think about how life is hard but states she would not kill self.  Struggling with body image issues / hx of anorexia.    Regarding her health maintenance:   Last pap: no hx  Abnormal Pap hx: none - first pap  Periods: as above  Contraception:  condoms  Last Mammo:not applicable   Last colonoscopy:not applicable   Bone density test:N\A   Last Lab: due for follow up   Last Td:current   Influenza vaccination:current   Pneumococcal vaccination:not applicable   Zostavax:not applicable   Hx STD''s: no   Regular exercise: yes      meds:   Current Outpatient Prescriptions   Medication Sig Dispense Refill   • buPROPion (WELLBUTRIN XL) 300 MG XL tablet Take 1 Tab by mouth every morning. 90 Tab 3   • citalopram (CELEXA) 40 MG Tab Take 1 Tab by mouth every day. 90 Tab 3   • acetaminophen (TYLENOL) 500 MG Tab Take 500-1,000 mg by mouth every 6 hours as needed.     • fluticasone (FLONASE) 50 MCG/ACT nasal spray Spray 2 Sprays in nose every day. 1 Bottle 3   • buPROPion (WELLBUTRIN XL) 150 MG XL tablet Take 1 Tab by mouth  "every morning. 30 Tab 5   • valacyclovir (VALTREX) 1 GM TABS Take 1 Tab by mouth 2 times a day as needed (cold sore outbreak). 60 Tab 5   • albuterol (VENTOLIN HFA) 108 (90 BASE) MCG/ACT AERS Inhale 1-2 Puffs by mouth every four hours as needed for Shortness of Breath (or coughing fit). 1 Inhaler 2     No current facility-administered medications for this visit.        Allergies: No Known Allergies    family:   No family history on file.    social hx:   Social History     Social History   • Marital status: Single     Spouse name: N/A   • Number of children: N/A   • Years of education: N/A     Occupational History   • Not on file.     Social History Main Topics   • Smoking status: Never Smoker   • Smokeless tobacco: Never Used   • Alcohol use No   • Drug use: No   • Sexual activity: No     Other Topics Concern   • Not on file     Social History Narrative   • No narrative on file       ROS:  No fever, chills, sweats.   No polydipsia, polyuria, temperature intolerance.  Positive weight gain  No visual changes, blurred vision.  No chest pain, palpitations, peripheral swelling   No chronic cough, shortness of breath, dyspnea with exertion.   No dysphagia, odynophagia, black or bloody stools.   No abdominal pain, nausea, persistent diarrhea, constipation   No dysuria, hematuria, incontinence. Denies nocturia  No rash, pruritis, pigment changes.   No focal weakness, syncope, headache, confusion, persistent numbness.     PHYSICAL EXAMINATION:  Blood pressure 122/80, pulse 90, temperature 36.4 °C (97.6 °F), resp. rate 14, height 1.727 m (5' 8\"), weight 71.7 kg (158 lb), last menstrual period 10/29/2018, SpO2 99 %, not currently breastfeeding.  General appearance:healthy, well developed, well nourished  Psych: alert, no distress, cooperative  Eyes: EOM's normal, pupils equal, round, reactive to light  ENT: Ears: external ears normal to inspection and palpation, TM's clear, Nose/Sinuses: nose shows no deformity, asymmetry, or " inflammation  Neck: no asymmetry, masses, or scars, no adenopathy, thyroid normal to palpation  Lungs:chest symmetric with normal A/P diameter, no chest deformities noted, normal respiratory rate and rhythm  Cardiovascular:regular rate and rhythm, S1 normal  Breasts: normal in size and symmetry, skin normal, physiologic fibronodularity  Abdomen: umbilicus normal, no masses palpable, no organomegaly  Musculoskeletal:ROM of all joints is normal, no evidence of joint instability  Lymphatic: None significantly enlarged  Skin: no rash, no edema  Neuro: mental status intact, cranial nerves 2-12 intact  Pelvic: external genitalia normal, Pap smear was somewhat difficult as patient was extremely tense, bimanual exam reveals normal uterus, adnexa without masses or tenderness, vaginal mucosa slightly increased and a thick white color    Depression Screening    Little interest or pleasure in doing things?   Nearly every day  Feeling down, depressed , or hopeless?  Nearly every day  Trouble falling or staying asleep, or sleeping too much?   Nearly every day  Feeling tired or having little energy?   Nearly every day  Poor appetite or overeating?   Nearly every day  Feeling bad about yourself - or that you are a failure or have let yourself or your family down?  Nearly every day  Trouble concentrating on things, such as reading the newspaper or watching television?  Nearly every day  Moving or speaking so slowly that other people could have noticed.  Or the opposite - being so fidgety or restless that you have been moving around a lot more than usual?   Not at all  Thoughts that you would be better off dead, or of hurting yourself?   Several days  Patient Health Questionnaire Score:  (!) 22      If depressive symptoms identified deferred to follow up visit unless specifically addressed in assesment and plan.    Interpretation of PHQ-9 Total Score   Score Severity   1-4 No Depression   5-9 Mild Depression   10-14 Moderate  Depression   15-19 Moderately Severe Depression   20-27 Severe Depression    ASSESSMENT/PLAN:  1.annual physical exam: HCM:  Pap and breast exams done.  BSE technique reviewed and patient encouraged to perform self-exam monthly.   Encourage monthly self breast exam  Encourage daily exercise for at least 30 minutes  Declines contraception.  Understands the importance of condom use to protect herself against STDs and pregnancy.  Recommend 1500 mg Calcium with 600 units vit d daily.    Recommend CBC, CMP, TSH and T4  2.  Depression: Referred urgently to psychiatry for medication review.  In the meantime she will continue on Celexa and Wellbutrin as well as restart an exercise program which has been helpful for her in the past.  We did discuss resources in town should she become suicidal such as self presenting to the emergency department, [place] or [facility].  She will also continue seeing her therapist.

## 2018-12-18 DIAGNOSIS — Z12.4 SCREENING FOR MALIGNANT NEOPLASM OF CERVIX: ICD-10-CM

## 2018-12-18 LAB
C TRACH DNA GENITAL QL NAA+PROBE: NEGATIVE
CYTOLOGY REG CYTOL: NORMAL
N GONORRHOEA DNA GENITAL QL NAA+PROBE: NEGATIVE
SPECIMEN SOURCE: NORMAL

## 2019-01-28 RX ORDER — AMOXICILLIN AND CLAVULANATE POTASSIUM 875; 125 MG/1; MG/1
1 TABLET, FILM COATED ORAL 2 TIMES DAILY
Qty: 14 TAB | Refills: 0 | Status: SHIPPED | OUTPATIENT
Start: 2019-01-28 | End: 2019-08-13

## 2019-05-09 ENCOUNTER — APPOINTMENT (OUTPATIENT)
Dept: BEHAVIORAL HEALTH | Facility: CLINIC | Age: 24
End: 2019-05-09

## 2019-08-13 ENCOUNTER — HOSPITAL ENCOUNTER (OUTPATIENT)
Dept: LAB | Facility: MEDICAL CENTER | Age: 24
End: 2019-08-13
Attending: NURSE PRACTITIONER
Payer: COMMERCIAL

## 2019-08-13 ENCOUNTER — OFFICE VISIT (OUTPATIENT)
Dept: MEDICAL GROUP | Facility: LAB | Age: 24
End: 2019-08-13
Payer: COMMERCIAL

## 2019-08-13 VITALS
HEIGHT: 68 IN | TEMPERATURE: 99 F | SYSTOLIC BLOOD PRESSURE: 99 MMHG | DIASTOLIC BLOOD PRESSURE: 66 MMHG | RESPIRATION RATE: 12 BRPM | WEIGHT: 170 LBS | OXYGEN SATURATION: 97 % | HEART RATE: 88 BPM | BODY MASS INDEX: 25.76 KG/M2

## 2019-08-13 DIAGNOSIS — R63.5 WEIGHT GAIN: ICD-10-CM

## 2019-08-13 DIAGNOSIS — Z00.00 PREVENTATIVE HEALTH CARE: ICD-10-CM

## 2019-08-13 DIAGNOSIS — F32.A MILD DEPRESSION: ICD-10-CM

## 2019-08-13 DIAGNOSIS — Z30.09 ENCOUNTER FOR COUNSELING REGARDING CONTRACEPTION: ICD-10-CM

## 2019-08-13 DIAGNOSIS — F98.8 ATTENTION DEFICIT DISORDER, UNSPECIFIED HYPERACTIVITY PRESENCE: ICD-10-CM

## 2019-08-13 DIAGNOSIS — Z83.49 FAMILY HISTORY OF THYROID DISEASE: ICD-10-CM

## 2019-08-13 DIAGNOSIS — Z83.438 FAMILY HISTORY OF HYPERLIPIDEMIA: ICD-10-CM

## 2019-08-13 DIAGNOSIS — Z79.899 CONTROLLED SUBSTANCE AGREEMENT SIGNED: ICD-10-CM

## 2019-08-13 LAB
ALBUMIN SERPL BCP-MCNC: 4.5 G/DL (ref 3.2–4.9)
ALBUMIN/GLOB SERPL: 1.3 G/DL
ALP SERPL-CCNC: 64 U/L (ref 30–99)
ALT SERPL-CCNC: 15 U/L (ref 2–50)
ANION GAP SERPL CALC-SCNC: 9 MMOL/L (ref 0–11.9)
AST SERPL-CCNC: 23 U/L (ref 12–45)
BASOPHILS # BLD AUTO: 1.1 % (ref 0–1.8)
BASOPHILS # BLD: 0.06 K/UL (ref 0–0.12)
BILIRUB SERPL-MCNC: 0.4 MG/DL (ref 0.1–1.5)
BUN SERPL-MCNC: 12 MG/DL (ref 8–22)
CALCIUM SERPL-MCNC: 9.5 MG/DL (ref 8.5–10.5)
CHLORIDE SERPL-SCNC: 109 MMOL/L (ref 96–112)
CHOLEST SERPL-MCNC: 181 MG/DL (ref 100–199)
CO2 SERPL-SCNC: 23 MMOL/L (ref 20–33)
CREAT SERPL-MCNC: 0.77 MG/DL (ref 0.5–1.4)
EOSINOPHIL # BLD AUTO: 0.13 K/UL (ref 0–0.51)
EOSINOPHIL NFR BLD: 2.3 % (ref 0–6.9)
ERYTHROCYTE [DISTWIDTH] IN BLOOD BY AUTOMATED COUNT: 41.4 FL (ref 35.9–50)
FASTING STATUS PATIENT QL REPORTED: NORMAL
GLOBULIN SER CALC-MCNC: 3.4 G/DL (ref 1.9–3.5)
GLUCOSE SERPL-MCNC: 96 MG/DL (ref 65–99)
HCT VFR BLD AUTO: 46 % (ref 37–47)
HDLC SERPL-MCNC: 38 MG/DL
HGB BLD-MCNC: 15 G/DL (ref 12–16)
IMM GRANULOCYTES # BLD AUTO: 0.01 K/UL (ref 0–0.11)
IMM GRANULOCYTES NFR BLD AUTO: 0.2 % (ref 0–0.9)
LDLC SERPL CALC-MCNC: 124 MG/DL
LYMPHOCYTES # BLD AUTO: 2.28 K/UL (ref 1–4.8)
LYMPHOCYTES NFR BLD: 41.2 % (ref 22–41)
MCH RBC QN AUTO: 29.5 PG (ref 27–33)
MCHC RBC AUTO-ENTMCNC: 32.6 G/DL (ref 33.6–35)
MCV RBC AUTO: 90.6 FL (ref 81.4–97.8)
MONOCYTES # BLD AUTO: 0.3 K/UL (ref 0–0.85)
MONOCYTES NFR BLD AUTO: 5.4 % (ref 0–13.4)
NEUTROPHILS # BLD AUTO: 2.76 K/UL (ref 2–7.15)
NEUTROPHILS NFR BLD: 49.8 % (ref 44–72)
NRBC # BLD AUTO: 0 K/UL
NRBC BLD-RTO: 0 /100 WBC
PLATELET # BLD AUTO: 332 K/UL (ref 164–446)
PMV BLD AUTO: 9.7 FL (ref 9–12.9)
POTASSIUM SERPL-SCNC: 4.3 MMOL/L (ref 3.6–5.5)
PROT SERPL-MCNC: 7.9 G/DL (ref 6–8.2)
RBC # BLD AUTO: 5.08 M/UL (ref 4.2–5.4)
SODIUM SERPL-SCNC: 141 MMOL/L (ref 135–145)
T4 FREE SERPL-MCNC: 0.75 NG/DL (ref 0.53–1.43)
TRIGL SERPL-MCNC: 97 MG/DL (ref 0–149)
TSH SERPL DL<=0.005 MIU/L-ACNC: 3.99 UIU/ML (ref 0.38–5.33)
WBC # BLD AUTO: 5.5 K/UL (ref 4.8–10.8)

## 2019-08-13 PROCEDURE — 80053 COMPREHEN METABOLIC PANEL: CPT

## 2019-08-13 PROCEDURE — 85025 COMPLETE CBC W/AUTO DIFF WBC: CPT

## 2019-08-13 PROCEDURE — 84443 ASSAY THYROID STIM HORMONE: CPT

## 2019-08-13 PROCEDURE — 80061 LIPID PANEL: CPT

## 2019-08-13 PROCEDURE — 36415 COLL VENOUS BLD VENIPUNCTURE: CPT

## 2019-08-13 PROCEDURE — 99214 OFFICE O/P EST MOD 30 MIN: CPT | Performed by: NURSE PRACTITIONER

## 2019-08-13 PROCEDURE — 84439 ASSAY OF FREE THYROXINE: CPT

## 2019-08-13 RX ORDER — DEXTROAMPHETAMINE SACCHARATE, AMPHETAMINE ASPARTATE MONOHYDRATE, DEXTROAMPHETAMINE SULFATE AND AMPHETAMINE SULFATE 2.5; 2.5; 2.5; 2.5 MG/1; MG/1; MG/1; MG/1
10 CAPSULE, EXTENDED RELEASE ORAL EVERY MORNING
Qty: 30 CAP | Refills: 0 | Status: SHIPPED | OUTPATIENT
Start: 2019-08-13 | End: 2019-09-12 | Stop reason: SDUPTHER

## 2019-08-13 NOTE — PROGRESS NOTES
Chief Complaint   Patient presents with   • Contraception     discuss   • Other     weight gain        HPI   Edilia is a 23-year-old established female here to discuss contraception, moods and weight gain.  She will be reentering college in a few weeks and then getting  within the year.  #1- contraception:  Struggles with remembering a pill.  Would like to discuss other methods of birth control, particularly an IUD.  Has irregular menses, typically every other month and not heavy.  Does not desire pregnancy anytime soon.  #2- weight gain: New issue.  Has gained 30 lbs in the past year.  Exercising and eating healthy since April and continues to gain weight.  Exercising 30-40 min cardio / 15 min strength - 3-4 d per week.  Avoiding white carbs.  Sticking to 1500 calories per day via an jordan on her phone.  Has a very strong family history of hypothyroidism and diabetes, would like to have labs drawn.  #3- depression: Chronic issue.  Tells me her moods have been mildly decreased lately because of her weight gain.  No SI or HI.  Not interested in restarting medication for depression.   Seeing a counselor weekly, which helps.   Was previously on celexa and wellbutrin - off both.  No SI or HI. Was rx adderall 10 mg bid via video call with a psychiatrist a few months ago and it was not helpful but would like a retrial of this for mood, weight and attention deficit assistance.  Has difficulty completing tasks, focusing on her schoolwork, and reading for long periods of time.  Has a strong family history of attention deficit disorder in her mother and 2 brothers.    Past medical, surgical, family, and social history is reviewed and updated in Epic chart by me today.   Medications and allergies reviewed and updated in Epic chart by me today.     ROS:   As documented in history of present illness above    Exam:  BP (!) 99/66 (BP Location: Left arm, Patient Position: Sitting, BP Cuff Size: Large adult)   Pulse 88    "Temp 37.2 °C (99 °F)   Resp 12   Ht 1.727 m (5' 8\")   Wt 77.1 kg (170 lb)   SpO2 97%   Constitutional: Alert, no distress, plus 3 vital signs  Skin:  Warm, dry, no rashes invisible areas  Eye: Equal, round and reactive, conjunctiva clear  ENMT: Lips without lesions, good dentition, oropharynx clear    Neck: Trachea midline, no masses, no thyromegaly  Respiratory: Unlabored respiratiom  Psych: Alert, pleasant, well-groomed, normal affect    Assessment / Plan / Medical Decision makin. Encounter for counseling regarding contraception  -We had a discussion about all available contraceptive methods.  She is strongly considering an IUD and paperwork was signed today in case she decides on this as she lives far from our office.  Her IUD order will be faxed when she emails regarding her final decision.    2. Weight gain  -Recommend TSH, T4, CBC, CMP and lipid panel.  Encouraged her to reduce her caloric intake down to 1300 keli/day and to continue with exercise for at least 30 minutes, 5 days/week.  I will follow-up with her after labs return and with further advice.  Also discussed that Adderall should help with her appetite control.    3. Family history of thyroid disease    4. Family history of hyperlipidemia    5. Mild depression (HCC)  -Declines prescription medications for this.  Doing well with her counselor.  She will follow-up with me if she has worsening depression.  Also discussed potential benefits from Adderall in terms of her moods.    6. Attention deficit disorder, unspecified hyperactivity presence  -Retrial of Adderall with an extended release.  Requested consult notes from the psychiatrist that she met with electronically in [place].  Discussed potential side effects of Adderall.  She will follow-up with me in 1 month.  Encouraged her to exercise.  In prescribing controlled substances to this patient, I certify that I have obtained and reviewed the medical history of Susan Mcwilliams. I have also made " a good nelly effort to obtain applicable records from other providers who have treated the patient and records did not demonstrate any increased risk of substance abuse that would prevent me from prescribing controlled substances.     I have conducted a physical exam and documented it. I have reviewed Ms. Mcwilliams’s prescription history as maintained by the [place] Prescription Monitoring Program.     I have assessed the patient’s risk for abuse, dependency, and addiction using the validated Opioid Risk Tool available at https://www.mdcYouEarnedIt.com/ttpnff-buvn-ffqw-ort-narcotic-abuse.     Given the above, I believe the benefits of controlled substance therapy outweigh the risks. The reasons for prescribing controlled substances include non-narcotic, oral analgesic alternatives have been inadequate for pain control. Accordingly, I have discussed the risk and benefits, treatment plan, and alternative therapies with the patient.     - amphetamine-dextroamphetamine XR (ADDERALL XR) 10 MG CAPSULE SR 24 HR; Take 1 Cap by mouth every morning for 30 days.  Dispense: 30 Cap; Refill: 0    7. Controlled substance agreement signed  - Controlled Substance Treatment Agreement    8. Preventative health care  - TSH; Future  - Lipid Profile; Future  - CBC WITH DIFFERENTIAL; Future  - Comp Metabolic Panel; Future  - FREE THYROXINE; Future

## 2019-08-13 NOTE — PATIENT INSTRUCTIONS
-IUD - kyleena - progesterone only    -implanon or nexplanon - arm insert - progesterone only    - depo provera - shots that are every 90 days and progesterone only    -nuva-ring - estrogen / progesterone - inserted every 3 weeks.

## 2019-08-15 DIAGNOSIS — R79.89 ABNORMAL TSH: ICD-10-CM

## 2019-09-12 ENCOUNTER — TELEPHONE (OUTPATIENT)
Dept: MEDICAL GROUP | Facility: LAB | Age: 24
End: 2019-09-12

## 2019-09-12 ENCOUNTER — OFFICE VISIT (OUTPATIENT)
Dept: MEDICAL GROUP | Facility: LAB | Age: 24
End: 2019-09-12
Payer: COMMERCIAL

## 2019-09-12 VITALS
DIASTOLIC BLOOD PRESSURE: 70 MMHG | BODY MASS INDEX: 25.91 KG/M2 | SYSTOLIC BLOOD PRESSURE: 110 MMHG | OXYGEN SATURATION: 97 % | TEMPERATURE: 98.2 F | HEIGHT: 68 IN | RESPIRATION RATE: 16 BRPM | HEART RATE: 80 BPM | WEIGHT: 171 LBS

## 2019-09-12 DIAGNOSIS — F98.8 ATTENTION DEFICIT DISORDER, UNSPECIFIED HYPERACTIVITY PRESENCE: ICD-10-CM

## 2019-09-12 DIAGNOSIS — G47.9 SLEEP DISTURBANCE: ICD-10-CM

## 2019-09-12 PROBLEM — F90.0 ATTENTION DEFICIT HYPERACTIVITY DISORDER (ADHD), PREDOMINANTLY INATTENTIVE TYPE: Status: ACTIVE | Noted: 2019-09-12

## 2019-09-12 PROCEDURE — 99213 OFFICE O/P EST LOW 20 MIN: CPT | Performed by: NURSE PRACTITIONER

## 2019-09-12 RX ORDER — CLONIDINE HYDROCHLORIDE 0.1 MG/1
0.1 TABLET ORAL NIGHTLY PRN
Qty: 30 TAB | Refills: 11 | Status: SHIPPED | OUTPATIENT
Start: 2019-09-12

## 2019-09-12 RX ORDER — DEXTROAMPHETAMINE SACCHARATE, AMPHETAMINE ASPARTATE MONOHYDRATE, DEXTROAMPHETAMINE SULFATE AND AMPHETAMINE SULFATE 2.5; 2.5; 2.5; 2.5 MG/1; MG/1; MG/1; MG/1
10 CAPSULE, EXTENDED RELEASE ORAL EVERY MORNING
Qty: 30 CAP | Refills: 0 | Status: SHIPPED | OUTPATIENT
Start: 2019-09-21 | End: 2019-10-29

## 2019-09-12 NOTE — PROGRESS NOTES
"CC  f/u    HPI  Edilia is a 23-year-old established female here to follow-up on depression, anxiety and ADHD.  At our visit 1 month ago we reinitiated Adderall XR 10 mg, initially started by a psychiatrist in [place].  She tells me that Adderall has been very helpful by improving her mood and anxiety.  Not as figety / anxious energy prone.  Denies any negative side effects of Adderall.  Takes Adderall XR 10 mg at 7 am and typically starts to feel tired again around 11-11:30.    Immediate release 10 mg did not help at all.      Sleep disturbance: This is a chronic issue for her.  She does not always have trouble falling asleep.  She was prescribed clonidine by psychiatry which was very helpful for her and she is requesting a refill.  She often has trouble falling asleep or staying asleep.    Past medical, surgical, family, and social history is reviewed and updated in Epic chart by me today.   Medications and allergies reviewed and updated in Epic chart by me today.     ROS:   As documented in history of present illness above    Exam:  /70 (BP Location: Right arm, Patient Position: Sitting, BP Cuff Size: Adult)   Pulse 80   Temp 36.8 °C (98.2 °F) (Temporal)   Resp 16   Ht 1.727 m (5' 8\")   Wt 77.6 kg (171 lb)   SpO2 97%   Constitutional: Alert, no distress, plus 3 vital signs  Skin:  Warm, dry, no rashes invisible areas  Eye: Equal, round and reactive, conjunctiva clear  ENMT: Lips without lesions, good dentition, oropharynx clear    Neck: Trachea midline  Psych: Alert, pleasant, well-groomed, normal affect    Assessment / Plan / Medical Decision makin. Sleep disturbance  -Refilled clonidine.  Reminded her of side effects such as hypotension and bradycardia as well as dizziness.  - cloNIDine (CATAPRES) 0.1 MG Tab; Take 1 Tab by mouth at bedtime as needed.  Dispense: 30 Tab; Refill: 11    2. Attention deficit disorder, unspecified hyperactivity presence  -Last refill was  by myself.  I " encouraged her to start eating breakfast with Adderall to extend the duration of this medication.  Also encouraged her to add in an exercise routine around 1130 or 12 when she begins to feel tired.  We will meet back together in 1 month and make a decision if she would like to stay on Adderall XR or do a trial of higher dosage Adderall immediate release.  Encouraged her to refrain from drinking more than 1 cup of caffeine per day with Adderall.  - amphetamine-dextroamphetamine XR (ADDERALL XR) 10 MG CAPSULE SR 24 HR; Take 1    In prescribing controlled substances to this patient, I certify that I have obtained and reviewed the medical history of Susan Mcwilliams. I have also made a good nelly effort to obtain applicable records from other providers who have treated the patient and records did not demonstrate any increased risk of substance abuse that would prevent me from prescribing controlled substances.     I have conducted a physical exam and documented it. I have reviewed Ms. Mcwilliams’s prescription history as maintained by the [place] Prescription Monitoring Program.     I have assessed the patient’s risk for abuse, dependency, and addiction using the validated Opioid Risk Tool available at https://www.mdcalc.com/xpvutf-mysp-aejs-ort-narcotic-abuse.     Given the above, I believe the benefits of controlled substance therapy outweigh the risks. The reasons for prescribing controlled substances include non-narcotic, oral analgesic alternatives have been inadequate for pain control. Accordingly, I have discussed the risk and benefits, treatment plan, and alternative therapies with the patient.    Cap by mouth every morning for 30 days.  Dispense: 30 Cap; Refill: 0

## 2019-10-29 ENCOUNTER — OFFICE VISIT (OUTPATIENT)
Dept: MEDICAL GROUP | Facility: LAB | Age: 24
End: 2019-10-29
Payer: COMMERCIAL

## 2019-10-29 VITALS
TEMPERATURE: 98.5 F | BODY MASS INDEX: 26.83 KG/M2 | DIASTOLIC BLOOD PRESSURE: 78 MMHG | HEART RATE: 72 BPM | WEIGHT: 177 LBS | SYSTOLIC BLOOD PRESSURE: 110 MMHG | HEIGHT: 68 IN | OXYGEN SATURATION: 100 % | RESPIRATION RATE: 14 BRPM

## 2019-10-29 DIAGNOSIS — Z30.430 ENCOUNTER FOR IUD INSERTION: ICD-10-CM

## 2019-10-29 PROCEDURE — 58300 INSERT INTRAUTERINE DEVICE: CPT | Performed by: NURSE PRACTITIONER

## 2019-10-30 NOTE — PROGRESS NOTES
"Chief Complaint   Patient presents with   • Contraception       IUD Insertion Note:  The patient presents for kyleena IUD insertion.  The pap smear history has been reviewed.  She has not had any recent unprotected sex. She has no contraindications to IUD and we discussed the risks, benefits, expected side effects and alternatives to the IUD.  She understands that the IUD does not protect against STI's.  All questions answered and written consent obtained.       Procedure Note:  /78 (BP Location: Left arm, Patient Position: Sitting, BP Cuff Size: Adult)   Pulse 72   Temp 36.9 °C (98.5 °F)   Resp 14   Ht 1.727 m (5' 8\")   Wt 80.3 kg (177 lb)   SpO2 100%     Bi-mannual pelvic examination: Normal external female genitalia.  No lesions. Uterus size shape and consistency wnl.  No adnexal masses.  No CMT.  Uterus position: anteverted  A speculum was placed into vagina and cervix  Normal appearing cervix, normal vaginal eloy, no purulent discharge  The cervix was cleaned with betadine.   I used sterile procedure gloves.    The uterus was measured using a sterile sound and was measured to be 7.5 cm.  The sound was then withdrawn. The IUD was loaded in a sterile manner and advanced into position. The string was visualized and cut to approximately 3.5cm.     Patient tolerated the procedure well. No complications.    She was given a detailed aftercare instructions and return/ER precautions as well as detailed instructions about her IUD.       1 mo follow up for IUD string check with me- optional - pt will come in if she can not feel her own strings.      LOT  [id number]     EXP  Jun 2021    A/P  1. Encounter for IUD insertion  See procedure note above.  "

## 2020-02-13 ENCOUNTER — HOSPITAL ENCOUNTER (OUTPATIENT)
Facility: MEDICAL CENTER | Age: 25
End: 2020-02-13
Attending: NURSE PRACTITIONER
Payer: COMMERCIAL

## 2020-02-13 ENCOUNTER — OFFICE VISIT (OUTPATIENT)
Dept: MEDICAL GROUP | Facility: LAB | Age: 25
End: 2020-02-13
Payer: COMMERCIAL

## 2020-02-13 VITALS
SYSTOLIC BLOOD PRESSURE: 92 MMHG | BODY MASS INDEX: 28.34 KG/M2 | DIASTOLIC BLOOD PRESSURE: 64 MMHG | WEIGHT: 187 LBS | TEMPERATURE: 99 F | OXYGEN SATURATION: 100 % | RESPIRATION RATE: 12 BRPM | HEIGHT: 68 IN | HEART RATE: 76 BPM

## 2020-02-13 DIAGNOSIS — Z12.4 SCREENING FOR MALIGNANT NEOPLASM OF CERVIX: ICD-10-CM

## 2020-02-13 DIAGNOSIS — Z00.00 PREVENTATIVE HEALTH CARE: ICD-10-CM

## 2020-02-13 DIAGNOSIS — Z01.419 ENCOUNTER FOR GYNECOLOGICAL EXAMINATION: ICD-10-CM

## 2020-02-13 DIAGNOSIS — R63.5 WEIGHT GAIN: ICD-10-CM

## 2020-02-13 DIAGNOSIS — Z83.49 FAMILY HISTORY OF THYROID DISEASE: ICD-10-CM

## 2020-02-13 PROCEDURE — 88175 CYTOPATH C/V AUTO FLUID REDO: CPT

## 2020-02-13 PROCEDURE — 99395 PREV VISIT EST AGE 18-39: CPT | Performed by: NURSE PRACTITIONER

## 2020-02-13 ASSESSMENT — PATIENT HEALTH QUESTIONNAIRE - PHQ9
5. POOR APPETITE OR OVEREATING: SEVERAL DAYS
1. LITTLE INTEREST OR PLEASURE IN DOING THINGS: NEARLY EVERY DAY
SUM OF ALL RESPONSES TO PHQ9 QUESTIONS 1 AND 2: 4
2. FEELING DOWN, DEPRESSED, IRRITABLE, OR HOPELESS: SEVERAL DAYS
9. THOUGHTS THAT YOU WOULD BE BETTER OFF DEAD, OR OF HURTING YOURSELF: NOT AT ALL
SUM OF ALL RESPONSES TO PHQ QUESTIONS 1-9: 17
8. MOVING OR SPEAKING SO SLOWLY THAT OTHER PEOPLE COULD HAVE NOTICED. OR THE OPPOSITE, BEING SO FIGETY OR RESTLESS THAT YOU HAVE BEEN MOVING AROUND A LOT MORE THAN USUAL: NOT AT ALL
3. TROUBLE FALLING OR STAYING ASLEEP OR SLEEPING TOO MUCH: NEARLY EVERY DAY
4. FEELING TIRED OR HAVING LITTLE ENERGY: NEARLY EVERY DAY
7. TROUBLE CONCENTRATING ON THINGS, SUCH AS READING THE NEWSPAPER OR WATCHING TELEVISION: NEARLY EVERY DAY
6. FEELING BAD ABOUT YOURSELF - OR THAT YOU ARE A FAILURE OR HAVE LET YOURSELF OR YOUR FAMILY DOWN: NEARLY EVERY DAY

## 2020-02-13 NOTE — PATIENT INSTRUCTIONS
Goal of daily fiber intake 15-20 grams.     Avoid all white carbs such as white rice, white bread, white pasta, potatoes.  Focus on protein, vegetables, nuts, beans, berries.   Do not drink sugary beverages such as juice or soda.

## 2020-02-13 NOTE — PROGRESS NOTES
Chief Complaint   Patient presents with   • Annual Exam     PAP       HPI:  Susan is a 24 y.o.  female who presents for annual exam. Generally the patient is feeling good.  She is worried about her weight, see below.  Newly .    Regarding her health maintenance:   Last pap: 2018  Abnormal Pap hx: none  Periods: very light  Contraception: IUD placed 10/2020   Last Mammo:not applicable   Last colonoscopy:not applicable   Bone density test:N\A   Last Lab: due for follow up   Last Td:current   Influenza vaccination:current   Pneumococcal vaccination:not applicable   Zostavax:not applicable   Hx STD''s: no   Regular exercise: yes  Has gained 70 lbs in 3 years.  Gained 10 lbs in the past 3 mo.    meds:   Current Outpatient Medications   Medication Sig Dispense Refill   • cloNIDine (CATAPRES) 0.1 MG Tab Take 1 Tab by mouth at bedtime as needed. 30 Tab 11   • fluticasone (FLONASE) 50 MCG/ACT nasal spray Spray 2 Sprays in nose every day. 1 Bottle 3   • valacyclovir (VALTREX) 1 GM TABS Take 1 Tab by mouth 2 times a day as needed (cold sore outbreak). 60 Tab 5   • albuterol (VENTOLIN HFA) 108 (90 BASE) MCG/ACT AERS Inhale 1-2 Puffs by mouth every four hours as needed for Shortness of Breath (or coughing fit). 1 Inhaler 2     No current facility-administered medications for this visit.        Allergies: No Known Allergies    family:   No family history on file.    social hx:   Social History     Socioeconomic History   • Marital status: Single     Spouse name: Not on file   • Number of children: Not on file   • Years of education: Not on file   • Highest education level: Not on file   Occupational History   • Not on file   Social Needs   • Financial resource strain: Not on file   • Food insecurity     Worry: Not on file     Inability: Not on file   • Transportation needs     Medical: Not on file     Non-medical: Not on file   Tobacco Use   • Smoking status: Never Smoker   • Smokeless tobacco: Never Used   Substance  "and Sexual Activity   • Alcohol use: No   • Drug use: No   • Sexual activity: Never   Lifestyle   • Physical activity     Days per week: Not on file     Minutes per session: Not on file   • Stress: Not on file   Relationships   • Social connections     Talks on phone: Not on file     Gets together: Not on file     Attends Mu-ism service: Not on file     Active member of club or organization: Not on file     Attends meetings of clubs or organizations: Not on file     Relationship status: Not on file   • Intimate partner violence     Fear of current or ex partner: Not on file     Emotionally abused: Not on file     Physically abused: Not on file     Forced sexual activity: Not on file   Other Topics Concern   • Not on file   Social History Narrative   • Not on file       ROS:  No fever, chills, sweats.   No polydipsia, polyuria, temperature intolerance  No visual changes, blurred vision.  No chest pain, palpitations, peripheral swelling   No chronic cough, shortness of breath, dyspnea with exertion.   No dysphagia, odynophagia, black or bloody stools.   No abdominal pain, nausea, persistent diarrhea, constipation   No dysuria, hematuria, incontinence. Denies nocturia  No rash, pruritis, pigment changes.   No focal weakness, syncope, headache, confusion, persistent numbness.   All other systems are reviewed and negative.    PHYSICAL EXAMINATION:  BP (!) 92/64 (BP Location: Left arm, Patient Position: Sitting, BP Cuff Size: Large adult)   Pulse 76   Temp 37.2 °C (99 °F)   Resp 12   Ht 1.727 m (5' 8\")   Wt 84.8 kg (187 lb)   SpO2 100%   General appearance:healthy, well developed, well nourished  Psych: alert, no distress, cooperative  Eyes: EOM's normal, pupils equal, round, reactive to light  ENT: Ears: external ears normal to inspection and palpation, TM's clear, Nose/Sinuses: nose shows no deformity, asymmetry, or inflammation  Neck: no asymmetry, masses, or scars, no adenopathy, thyroid normal to " palpation  Lungs:chest symmetric with normal A/P diameter, no chest deformities noted, normal respiratory rate and rhythm  Cardiovascular:regular rate and rhythm, S1 normal  Breasts: normal in size and symmetry, skin normal, physiologic fibronodularity  Abdomen: umbilicus normal, no masses palpable, no organomegaly  Musculoskeletal:ROM of all joints is normal, no evidence of joint instability  Lymphatic: None significantly enlarged  Skin: no rash, no edema  Neuro: mental status intact, cranial nerves 2-12 intact  Pelvic: external genitalia normal, cervix normal in appearance, bimanual exam reveals normal uterus, adnexa without masses or tenderness, vaginal mucosa normal    ASSESSMENT/PLAN:  1.annual physical exam: HCM:  Pap and breast exams done.  BSE technique reviewed and patient encouraged to perform self-exam monthly.   Encourage monthly self breast exam  Encourage daily exercise for at least 30 minutes  Recommend 1500 mg Calcium with 600 units vit d daily.    Recommend CBC, CMP, TSH, T4, LP - fasting.  She does have a very strong family history of thyroid disease and is concerned that her thyroid is not working well.  We had a good discussion and I gave her written information about a high-fiber, plant-based, low carbohydrate diet.  I would like to see her back here in 3 months to check on her weight.  Doing well with her IUD.

## 2020-02-14 ENCOUNTER — HOSPITAL ENCOUNTER (OUTPATIENT)
Dept: LAB | Facility: MEDICAL CENTER | Age: 25
End: 2020-02-14
Attending: NURSE PRACTITIONER
Payer: COMMERCIAL

## 2020-02-14 DIAGNOSIS — Z00.00 PREVENTATIVE HEALTH CARE: ICD-10-CM

## 2020-02-14 DIAGNOSIS — R63.5 WEIGHT GAIN: ICD-10-CM

## 2020-02-14 DIAGNOSIS — Z83.49 FAMILY HISTORY OF THYROID DISEASE: ICD-10-CM

## 2020-02-14 LAB
ALBUMIN SERPL BCP-MCNC: 4.6 G/DL (ref 3.2–4.9)
ALBUMIN/GLOB SERPL: 1.5 G/DL
ALP SERPL-CCNC: 74 U/L (ref 30–99)
ALT SERPL-CCNC: 11 U/L (ref 2–50)
ANION GAP SERPL CALC-SCNC: 7 MMOL/L (ref 0–11.9)
AST SERPL-CCNC: 17 U/L (ref 12–45)
BASOPHILS # BLD AUTO: 1 % (ref 0–1.8)
BASOPHILS # BLD: 0.06 K/UL (ref 0–0.12)
BILIRUB SERPL-MCNC: 0.5 MG/DL (ref 0.1–1.5)
BUN SERPL-MCNC: 11 MG/DL (ref 8–22)
CALCIUM SERPL-MCNC: 9.4 MG/DL (ref 8.5–10.5)
CHLORIDE SERPL-SCNC: 107 MMOL/L (ref 96–112)
CHOLEST SERPL-MCNC: 167 MG/DL (ref 100–199)
CO2 SERPL-SCNC: 24 MMOL/L (ref 20–33)
CREAT SERPL-MCNC: 0.75 MG/DL (ref 0.5–1.4)
CYTOLOGY REG CYTOL: NORMAL
EOSINOPHIL # BLD AUTO: 0.12 K/UL (ref 0–0.51)
EOSINOPHIL NFR BLD: 2 % (ref 0–6.9)
ERYTHROCYTE [DISTWIDTH] IN BLOOD BY AUTOMATED COUNT: 43 FL (ref 35.9–50)
EST. AVERAGE GLUCOSE BLD GHB EST-MCNC: 105 MG/DL
FASTING STATUS PATIENT QL REPORTED: NORMAL
GLOBULIN SER CALC-MCNC: 3 G/DL (ref 1.9–3.5)
GLUCOSE SERPL-MCNC: 97 MG/DL (ref 65–99)
HBA1C MFR BLD: 5.3 % (ref 0–5.6)
HCT VFR BLD AUTO: 46.5 % (ref 37–47)
HDLC SERPL-MCNC: 40 MG/DL
HGB BLD-MCNC: 14.9 G/DL (ref 12–16)
IMM GRANULOCYTES # BLD AUTO: 0.01 K/UL (ref 0–0.11)
IMM GRANULOCYTES NFR BLD AUTO: 0.2 % (ref 0–0.9)
LDLC SERPL CALC-MCNC: 117 MG/DL
LYMPHOCYTES # BLD AUTO: 2.43 K/UL (ref 1–4.8)
LYMPHOCYTES NFR BLD: 39.9 % (ref 22–41)
MCH RBC QN AUTO: 29.7 PG (ref 27–33)
MCHC RBC AUTO-ENTMCNC: 32 G/DL (ref 33.6–35)
MCV RBC AUTO: 92.8 FL (ref 81.4–97.8)
MONOCYTES # BLD AUTO: 0.38 K/UL (ref 0–0.85)
MONOCYTES NFR BLD AUTO: 6.2 % (ref 0–13.4)
NEUTROPHILS # BLD AUTO: 3.09 K/UL (ref 2–7.15)
NEUTROPHILS NFR BLD: 50.7 % (ref 44–72)
NRBC # BLD AUTO: 0 K/UL
NRBC BLD-RTO: 0 /100 WBC
PLATELET # BLD AUTO: 348 K/UL (ref 164–446)
PMV BLD AUTO: 10.1 FL (ref 9–12.9)
POTASSIUM SERPL-SCNC: 4.1 MMOL/L (ref 3.6–5.5)
PROT SERPL-MCNC: 7.6 G/DL (ref 6–8.2)
RBC # BLD AUTO: 5.01 M/UL (ref 4.2–5.4)
SODIUM SERPL-SCNC: 138 MMOL/L (ref 135–145)
T4 FREE SERPL-MCNC: 0.7 NG/DL (ref 0.53–1.43)
TRIGL SERPL-MCNC: 51 MG/DL (ref 0–149)
TSH SERPL DL<=0.005 MIU/L-ACNC: 2.1 UIU/ML (ref 0.38–5.33)
WBC # BLD AUTO: 6.1 K/UL (ref 4.8–10.8)

## 2020-02-14 PROCEDURE — 84439 ASSAY OF FREE THYROXINE: CPT

## 2020-02-14 PROCEDURE — 36415 COLL VENOUS BLD VENIPUNCTURE: CPT

## 2020-02-14 PROCEDURE — 84443 ASSAY THYROID STIM HORMONE: CPT

## 2020-02-14 PROCEDURE — 83036 HEMOGLOBIN GLYCOSYLATED A1C: CPT

## 2020-02-14 PROCEDURE — 80053 COMPREHEN METABOLIC PANEL: CPT

## 2020-02-14 PROCEDURE — 80061 LIPID PANEL: CPT

## 2020-02-14 PROCEDURE — 85025 COMPLETE CBC W/AUTO DIFF WBC: CPT

## 2020-05-21 ENCOUNTER — TELEMEDICINE (OUTPATIENT)
Dept: MEDICAL GROUP | Facility: LAB | Age: 25
End: 2020-05-21
Payer: COMMERCIAL

## 2020-05-21 VITALS
BODY MASS INDEX: 28.79 KG/M2 | HEIGHT: 68 IN | WEIGHT: 190 LBS | DIASTOLIC BLOOD PRESSURE: 76 MMHG | SYSTOLIC BLOOD PRESSURE: 112 MMHG

## 2020-05-21 DIAGNOSIS — F33.42 RECURRENT MAJOR DEPRESSIVE DISORDER, IN FULL REMISSION (HCC): ICD-10-CM

## 2020-05-21 DIAGNOSIS — R63.5 WEIGHT GAIN: ICD-10-CM

## 2020-05-21 DIAGNOSIS — F33.1 MODERATE EPISODE OF RECURRENT MAJOR DEPRESSIVE DISORDER (HCC): ICD-10-CM

## 2020-05-21 PROCEDURE — 99214 OFFICE O/P EST MOD 30 MIN: CPT | Mod: 95,CR | Performed by: NURSE PRACTITIONER

## 2020-05-21 RX ORDER — BUPROPION HYDROCHLORIDE 150 MG/1
150 TABLET ORAL EVERY MORNING
Qty: 30 TAB | Refills: 5 | Status: SHIPPED | OUTPATIENT
Start: 2020-05-21 | End: 2020-06-24 | Stop reason: SDUPTHER

## 2020-05-21 RX ORDER — CITALOPRAM HYDROBROMIDE 10 MG/1
10 TABLET ORAL DAILY
Qty: 30 TAB | Refills: 5 | Status: SHIPPED | OUTPATIENT
Start: 2020-05-21 | End: 2020-06-24

## 2020-05-21 ASSESSMENT — FIBROSIS 4 INDEX: FIB4 SCORE: 0.32

## 2020-05-21 NOTE — PROGRESS NOTES
"Telemedicine Visit: Established Patient     This encounter was conducted via Zoom .   Verbal consent was obtained. Patient's identity was verified.    Subjective:   CC:   Susan is a 24 y.o. female presenting for evaluation and management of:    Obesity: This has been a chronic issue for the patient for the past year approximately.  She is feeling discouraged that she has continued to gain weight although a very small amount in the past 3 months.  Weight 187 lbs at our visit 2/2020, now up to 190 lbs.  170 lbs 8/2019, 145 lbs 5/2018.  Tells me she has been eating smaller portions, vegetable based carbs, high fiber.  Exercise: not regular since pandemic.    Just graduated from college.    Now .      Anxiety/depression:  Worsening lately, particularly with pandemic.  Having panic attacks in stores / public places, etc.  Coping techniques:  Counting things she can control, deep breathing, talking to .  Depression is also \"creeping back up.\"  Finds her weight contributes to her depression b/c of lower self image.    Didn't feel benefit from adderall but did with wellbutrin.  Interested in going back on Wellbutrin.  Did well with both Wellbutrin and Celexa, stating that without Celexa, she felt fairly robust like on Wellbutrin.    ROS   Denies any recent fevers or chills. No nausea or vomiting. No chest pains or shortness of breath.     Allergies   Allergen Reactions   • Nkda [No Known Drug Allergy]        Current medicines (including changes today)  Current Outpatient Medications   Medication Sig Dispense Refill   • cloNIDine (CATAPRES) 0.1 MG Tab Take 1 Tab by mouth at bedtime as needed. 30 Tab 11   • valacyclovir (VALTREX) 1 GM TABS Take 1 Tab by mouth 2 times a day as needed (cold sore outbreak). 60 Tab 5     No current facility-administered medications for this visit.        Patient Active Problem List    Diagnosis Date Noted   • Attention deficit hyperactivity disorder (ADHD), predominantly " "inattentive type 09/12/2019   • Depression 03/21/2016   • Acne vulgaris 09/08/2015       History reviewed. No pertinent family history.    She  has a past medical history of No known health problems.  She  has no past surgical history on file.       Objective:   /76   Ht 1.727 m (5' 8\")   Wt 86.2 kg (190 lb)     Physical Exam:  Constitutional: Alert, no distress, well-groomed.  Skin: No rashes in visible areas.  Eye: Round. Conjunctiva clear, lids normal. No icterus.   ENMT: Lips pink without lesions, good dentition, moist mucous membranes. Phonation normal.  Neck: No masses, no thyromegaly. Moves freely without pain.  CV: Pulse as reported by patient  Respiratory: Unlabored respiratory effort, no cough or audible wheeze  Psych: Alert and oriented x3, normal affect and mood.       Assessment and Plan:   The following treatment plan was discussed:   \"  1. Recurrent major depressive disorder, in full remission (HCC)     2. Moderate episode of recurrent major depressive disorder (HCC)  buPROPion (WELLBUTRIN XL) 150 MG XL tablet    citalopram (CELEXA) 10 MG tablet   3. Weight gain       In reviewing her chart, it appears that her weight was also much better when she was on Wellbutrin and Celexa, both of which were restarted for her today for anxiety, depression and appetite control.  Reminded her of potential side effects as well as length of onset efficacy of Wellbutrin and Celexa.  I will see her back here in 1 month with potential to increase her Celexa dosage as she was only started back on 10 mg.  I also encouraged her to start a walking/jogging routine in which she does this for 30 minutes, 5 days/week, hopefully with her .  She denies any suicidal or homicidal ideations.  We discussed the importance of continued healthy eating.    Side effects of all medications prescribed today were discussed with the patient including how to take the medications and proper dosage. Discussed repercussions of not " taking the medications as prescribed. Instructed to call the office should she have any negative side effects or problems with the medications.        Follow-up: 1 mo

## 2020-06-24 ENCOUNTER — OFFICE VISIT (OUTPATIENT)
Dept: MEDICAL GROUP | Facility: LAB | Age: 25
End: 2020-06-24
Payer: COMMERCIAL

## 2020-06-24 VITALS
OXYGEN SATURATION: 98 % | BODY MASS INDEX: 28.49 KG/M2 | SYSTOLIC BLOOD PRESSURE: 110 MMHG | DIASTOLIC BLOOD PRESSURE: 76 MMHG | HEIGHT: 68 IN | HEART RATE: 74 BPM | TEMPERATURE: 98.2 F | RESPIRATION RATE: 12 BRPM | WEIGHT: 188 LBS

## 2020-06-24 DIAGNOSIS — B00.9 HSV-1 INFECTION: ICD-10-CM

## 2020-06-24 DIAGNOSIS — F41.1 GENERALIZED ANXIETY DISORDER: ICD-10-CM

## 2020-06-24 DIAGNOSIS — F33.1 MODERATE EPISODE OF RECURRENT MAJOR DEPRESSIVE DISORDER (HCC): ICD-10-CM

## 2020-06-24 PROCEDURE — 99214 OFFICE O/P EST MOD 30 MIN: CPT | Performed by: NURSE PRACTITIONER

## 2020-06-24 RX ORDER — BUPROPION HYDROCHLORIDE 150 MG/1
150 TABLET ORAL EVERY MORNING
Qty: 30 TAB | Refills: 5 | Status: SHIPPED | OUTPATIENT
Start: 2020-06-24

## 2020-06-24 RX ORDER — VALACYCLOVIR HYDROCHLORIDE 1 G/1
1000 TABLET, FILM COATED ORAL 2 TIMES DAILY PRN
Qty: 60 TAB | Refills: 5 | Status: SHIPPED | OUTPATIENT
Start: 2020-06-24

## 2020-06-24 RX ORDER — ACYCLOVIR 50 MG/G
1 OINTMENT TOPICAL
Qty: 1 TUBE | Refills: 5 | Status: SHIPPED | OUTPATIENT
Start: 2020-06-24

## 2020-06-24 RX ORDER — CITALOPRAM 20 MG/1
20 TABLET ORAL DAILY
Qty: 45 TAB | Refills: 5 | Status: SHIPPED | OUTPATIENT
Start: 2020-06-24

## 2020-06-24 ASSESSMENT — FIBROSIS 4 INDEX: FIB4 SCORE: 0.32

## 2020-06-24 NOTE — PROGRESS NOTES
"Chief Complaint   Patient presents with   • Medication Management       HPI  Edilia is a 24-year-old established female here to follow-up on a few issues:  1.-  Cold sores: Chronic/recurrent issue.  New onset cold sore to lower lip x 2 d - has taken 4 valtrex over the past 48 hours and sore persists but pain is improving.  Requesting a refill of valacyclovir and a topical if possible.  Cold sore burns and is painful.    2-Depression with anxiety: Chronic issues.  Currently taking Wellbutrin 150 mg and Celexa 10 mg.  Tells me that her depression with irritability has been doing really well but anxiety is \"through the roof.\"  Picking at scalp / nails.  Sleeping well with meditation.  She has difficulty identifying what makes her anxious with the exception of moving to [place] in August as well as her finances.  She is exercising which she has found extremely helpful.    Gym x 3 weeks - 20 min treadmill / stretching b4 and after.      Past medical, surgical, family, and social history is reviewed and updated in Epic chart by me today.   Medications and allergies reviewed and updated in Epic chart by me today.     ROS:   As documented in history of present illness above    Exam:  /76 (BP Location: Right arm, Patient Position: Sitting, BP Cuff Size: Adult)   Pulse 74   Temp 36.8 °C (98.2 °F)   Resp 12   Ht 1.727 m (5' 8\")   Wt 85.3 kg (188 lb)   SpO2 98%   Constitutional: Alert, no distress, plus 3 vital signs  Skin:  Warm, dry.  She does have an angry appearing cold sore to her left lower lip that measures about 5 mm -no surrounding erythema or discharge.  Eye: Equal, round and reactive, conjunctiva clear  ENMT: Lips without lesions, good dentition, oropharynx clear    Neck: Trachea midline, no masses, no thyromegaly  Respiratory: Unlabored respiration, lungs clear to auscultation, no wheezes, no rhonchi  Cardiovascular: Normal rate and rhythm.  Psych: Alert, pleasant, well-groomed, normal " affect    Assessment / Plan / Medical Decision makin. HSV-1 infection  -Refilled valacyclovir and prescribe topical Zovirax if covered by insurance.  She understands the contagiousness of HSV.  She will follow-up with me if this cold sores not resolving.  - valacyclovir (VALTREX) 1 GM Tab; Take 1 Tab by mouth 2 times a day as needed (cold sore outbreak).  Dispense: 60 Tab; Refill: 5  - acyclovir (ZOVIRAX) 5 % Ointment; Apply 1 Application to affected area(s) every 3 hours.  Dispense: 1 Tube; Refill: 5    2. Moderate episode of recurrent major depressive disorder (HCC)  -Much improved with 150 mg of Wellbutrin.  Follow-up 3 to 6 months.  - buPROPion (WELLBUTRIN XL) 150 MG XL tablet; Take 1 Tab by mouth every morning.  Dispense: 30 Tab; Refill: 5    3. Generalized anxiety disorder  -Not controlled.  Increase Celexa to 20 mg for the next several weeks with further allowance to increase to 40 mg within a month.  Discussed potential side effects of increasing citalopram.  She has tolerated 40 mg of citalopram well in the past.  Encouraged to follow-up here in 1 month, prior to moving to [place].  Encouraged her to continue to exercise and limit her caffeine intake.  She will continue with meditation for anxiety.